# Patient Record
Sex: FEMALE | Race: WHITE | NOT HISPANIC OR LATINO | Employment: UNEMPLOYED | URBAN - METROPOLITAN AREA
[De-identification: names, ages, dates, MRNs, and addresses within clinical notes are randomized per-mention and may not be internally consistent; named-entity substitution may affect disease eponyms.]

---

## 2023-02-08 ENCOUNTER — APPOINTMENT (OUTPATIENT)
Dept: GENERAL RADIOLOGY | Facility: HOSPITAL | Age: 63
End: 2023-02-08
Payer: COMMERCIAL

## 2023-02-08 ENCOUNTER — HOSPITAL ENCOUNTER (EMERGENCY)
Facility: HOSPITAL | Age: 63
Discharge: HOME | End: 2023-02-08
Attending: EMERGENCY MEDICINE | Admitting: EMERGENCY MEDICINE
Payer: COMMERCIAL

## 2023-02-08 VITALS
RESPIRATION RATE: 14 BRPM | SYSTOLIC BLOOD PRESSURE: 140 MMHG | TEMPERATURE: 98.6 F | HEIGHT: 63 IN | DIASTOLIC BLOOD PRESSURE: 96 MMHG | HEART RATE: 73 BPM | BODY MASS INDEX: 29.95 KG/M2 | OXYGEN SATURATION: 92 % | WEIGHT: 169 LBS

## 2023-02-08 DIAGNOSIS — R00.2 PALPITATIONS: Primary | ICD-10-CM

## 2023-02-08 DIAGNOSIS — I49.49 PREMATURE BEATS: ICD-10-CM

## 2023-02-08 LAB
ALBUMIN SERPL-MCNC: 4.5 G/DL (ref 3.5–5.2)
ALBUMIN/GLOB SERPL: 1.6 G/DL
ALP SERPL-CCNC: 108 U/L (ref 39–117)
ALT SERPL W P-5'-P-CCNC: 52 U/L (ref 1–33)
ANION GAP SERPL CALCULATED.3IONS-SCNC: 9 MMOL/L (ref 5–15)
AST SERPL-CCNC: 44 U/L (ref 1–32)
BASOPHILS # BLD AUTO: 0.06 10*3/MM3 (ref 0–0.2)
BASOPHILS NFR BLD AUTO: 0.7 % (ref 0–1.5)
BILIRUB SERPL-MCNC: 1.4 MG/DL (ref 0–1.2)
BUN SERPL-MCNC: 13 MG/DL (ref 8–23)
BUN/CREAT SERPL: 18.3 (ref 7–25)
CALCIUM SPEC-SCNC: 9.4 MG/DL (ref 8.6–10.5)
CHLORIDE SERPL-SCNC: 103 MMOL/L (ref 98–107)
CO2 SERPL-SCNC: 29 MMOL/L (ref 22–29)
CREAT SERPL-MCNC: 0.71 MG/DL (ref 0.57–1)
DEPRECATED RDW RBC AUTO: 42.2 FL (ref 37–54)
EGFRCR SERPLBLD CKD-EPI 2021: 95.7 ML/MIN/1.73
EOSINOPHIL # BLD AUTO: 0.17 10*3/MM3 (ref 0–0.4)
EOSINOPHIL NFR BLD AUTO: 1.9 % (ref 0.3–6.2)
ERYTHROCYTE [DISTWIDTH] IN BLOOD BY AUTOMATED COUNT: 12.8 % (ref 12.3–15.4)
GEN 5 2HR TROPONIN T REFLEX: <6 NG/L
GLOBULIN UR ELPH-MCNC: 2.9 GM/DL
GLUCOSE SERPL-MCNC: 110 MG/DL (ref 65–99)
HCT VFR BLD AUTO: 49.5 % (ref 34–46.6)
HGB BLD-MCNC: 16.2 G/DL (ref 12–15.9)
HOLD SPECIMEN: NORMAL
IMM GRANULOCYTES # BLD AUTO: 0.02 10*3/MM3 (ref 0–0.05)
IMM GRANULOCYTES NFR BLD AUTO: 0.2 % (ref 0–0.5)
LYMPHOCYTES # BLD AUTO: 2.08 10*3/MM3 (ref 0.7–3.1)
LYMPHOCYTES NFR BLD AUTO: 23.6 % (ref 19.6–45.3)
MAGNESIUM SERPL-MCNC: 2 MG/DL (ref 1.6–2.4)
MCH RBC QN AUTO: 29.6 PG (ref 26.6–33)
MCHC RBC AUTO-ENTMCNC: 32.7 G/DL (ref 31.5–35.7)
MCV RBC AUTO: 90.3 FL (ref 79–97)
MONOCYTES # BLD AUTO: 0.78 10*3/MM3 (ref 0.1–0.9)
MONOCYTES NFR BLD AUTO: 8.8 % (ref 5–12)
NEUTROPHILS NFR BLD AUTO: 5.72 10*3/MM3 (ref 1.7–7)
NEUTROPHILS NFR BLD AUTO: 64.8 % (ref 42.7–76)
NRBC BLD AUTO-RTO: 0 /100 WBC (ref 0–0.2)
NT-PROBNP SERPL-MCNC: 50.6 PG/ML (ref 0–900)
PLATELET # BLD AUTO: 273 10*3/MM3 (ref 140–450)
PMV BLD AUTO: 11.1 FL (ref 6–12)
POTASSIUM SERPL-SCNC: 4.3 MMOL/L (ref 3.5–5.2)
PROT SERPL-MCNC: 7.4 G/DL (ref 6–8.5)
RBC # BLD AUTO: 5.48 10*6/MM3 (ref 3.77–5.28)
SODIUM SERPL-SCNC: 141 MMOL/L (ref 136–145)
TROPONIN T DELTA: NORMAL
TROPONIN T SERPL HS-MCNC: <6 NG/L
TSH SERPL DL<=0.05 MIU/L-ACNC: 1.31 UIU/ML (ref 0.27–4.2)
WBC NRBC COR # BLD: 8.83 10*3/MM3 (ref 3.4–10.8)
WHOLE BLOOD HOLD COAG: NORMAL
WHOLE BLOOD HOLD SPECIMEN: NORMAL

## 2023-02-08 PROCEDURE — 85025 COMPLETE CBC W/AUTO DIFF WBC: CPT

## 2023-02-08 PROCEDURE — 84484 ASSAY OF TROPONIN QUANT: CPT

## 2023-02-08 PROCEDURE — 83880 ASSAY OF NATRIURETIC PEPTIDE: CPT

## 2023-02-08 PROCEDURE — 83735 ASSAY OF MAGNESIUM: CPT

## 2023-02-08 PROCEDURE — 36415 COLL VENOUS BLD VENIPUNCTURE: CPT

## 2023-02-08 PROCEDURE — 71045 X-RAY EXAM CHEST 1 VIEW: CPT

## 2023-02-08 PROCEDURE — 93005 ELECTROCARDIOGRAM TRACING: CPT

## 2023-02-08 PROCEDURE — 80053 COMPREHEN METABOLIC PANEL: CPT

## 2023-02-08 PROCEDURE — 84443 ASSAY THYROID STIM HORMONE: CPT

## 2023-02-08 PROCEDURE — 99282 EMERGENCY DEPT VISIT SF MDM: CPT

## 2023-02-08 RX ORDER — SODIUM CHLORIDE 0.9 % (FLUSH) 0.9 %
10 SYRINGE (ML) INJECTION AS NEEDED
Status: DISCONTINUED | OUTPATIENT
Start: 2023-02-08 | End: 2023-02-08 | Stop reason: HOSPADM

## 2023-02-08 NOTE — ED PROVIDER NOTES
Subjective   History of Present Illness  62-year-old female who presents for evaluation of low heart rate and fatigue.  The patient reports that for the last 2 weeks she has been intermittently in a low heart rate.  She reports at times she has recorded to less than 40.  She does report feeling mildly fatigued in association with it.  She denies any history of an abnormal heart rhythm.  She does admit that she is checking that on a pulse oximeter or on her Fitbit watch.  She is not truly checking the electrical activity.  She does not take any anticoagulation.  No reported chest or abdominal pain.  No reported fever, body aches, or chills.  She feels like her oral intake of fluid is normal.  She appears well nontoxic and in no acute distress.  No new medications.  No other acute complaints        Review of Systems   Constitutional: Positive for fatigue. Negative for chills and fever.   HENT: Negative for congestion, ear pain, postnasal drip, sinus pressure and sore throat.    Eyes: Negative for pain, redness and visual disturbance.   Respiratory: Negative for cough, chest tightness and shortness of breath.    Cardiovascular: Positive for palpitations. Negative for chest pain and leg swelling.   Gastrointestinal: Negative for abdominal pain, anal bleeding, blood in stool, diarrhea, nausea and vomiting.   Endocrine: Negative for polydipsia and polyuria.   Genitourinary: Negative for difficulty urinating, dysuria, frequency and urgency.   Musculoskeletal: Negative for arthralgias, back pain and neck pain.   Skin: Negative for pallor and rash.   Allergic/Immunologic: Negative for environmental allergies and immunocompromised state.   Neurological: Negative for dizziness, weakness and headaches.   Hematological: Negative for adenopathy.   Psychiatric/Behavioral: Negative for confusion, self-injury and suicidal ideas. The patient is not nervous/anxious.    All other systems reviewed and are negative.      No past medical  history on file.    Not on File    No past surgical history on file.    No family history on file.    Social History     Socioeconomic History   • Marital status:            Objective   Physical Exam  Vitals and nursing note reviewed.   Constitutional:       General: She is not in acute distress.     Appearance: Normal appearance. She is well-developed. She is not toxic-appearing or diaphoretic.   HENT:      Head: Normocephalic and atraumatic.      Right Ear: External ear normal.      Left Ear: External ear normal.      Nose: Nose normal.   Eyes:      General: Lids are normal.      Pupils: Pupils are equal, round, and reactive to light.   Neck:      Trachea: No tracheal deviation.   Cardiovascular:      Rate and Rhythm: Normal rate. Rhythm irregularly irregular.      Pulses: No decreased pulses.      Heart sounds: Normal heart sounds. No murmur heard.    No friction rub. No gallop.      Comments: Patient was noted to have significant ectopy with PACs and PVCs identified on EKG.  Heart rate is normal in the 80s.  Pulmonary:      Effort: Pulmonary effort is normal. No respiratory distress.      Breath sounds: Normal breath sounds. No decreased breath sounds, wheezing, rhonchi or rales.   Abdominal:      General: Bowel sounds are normal.      Palpations: Abdomen is soft.      Tenderness: There is no abdominal tenderness. There is no guarding or rebound.   Musculoskeletal:         General: No deformity. Normal range of motion.      Cervical back: Normal range of motion and neck supple.   Lymphadenopathy:      Cervical: No cervical adenopathy.   Skin:     General: Skin is warm and dry.      Findings: No rash.   Neurological:      Mental Status: She is alert and oriented to person, place, and time.      Cranial Nerves: No cranial nerve deficit.      Sensory: No sensory deficit.   Psychiatric:         Speech: Speech normal.         Behavior: Behavior normal.         Thought Content: Thought content normal.          Judgment: Judgment normal.         Procedures           ED Course                                           Medical Decision Making  Differential diagnosis includes premature beats, abnormal heart rhythm, near syncope, dehydration, anemia, electrolyte abnormality.    The patient was identified to have both PACs and PVCs on the EKG that was performed here.  I feel this is most likely accounting for her low recorded heart rate.  I suspect that her heart rate is not truly low.  No abnormal heart rhythm identified.    Lab work is normal and reassuring.    Vital signs are normal and reassuring.  The patient has a normal neurological exam.    Palpitations: acute illness or injury     Details: Referred to cardiology clinic for outpatient evaluation.  Premature beats: acute illness or injury     Details: Multiple PACs and PVCs identified on rhythm strip.  I feel this is leading to a decrease recorded rate on peripheral devices such as a Fitbit and pulse oximeter  Amount and/or Complexity of Data Reviewed  Independent Historian: spouse  Labs: ordered.     Details: Normal nonconcerning lab work-up.  Radiology: ordered.     Details: Chest x-ray reports possible pulmonary vascular congestion.  BNP is normal.  Lungs are clear to auscultation.  Oxygen saturations normal.  ECG/medicine tests: ordered.     Details: EKG does not show acute ischemic changes.  PACs and PVCs.          Final diagnoses:   Palpitations   Premature beats       ED Disposition  ED Disposition     ED Disposition   Discharge    Condition   Stable    Comment   --             Kelsie Jacobsen MD  [address] [facility] [address]  [phone]    In 1 week      [address]  [facility] [address] [phone]             Medication List      No changes were made to your prescriptions during this visit.          Baldemar Price MD  02/08/23 9716

## 2023-02-15 PROBLEM — K21.9 GASTROESOPHAGEAL REFLUX DISEASE WITHOUT ESOPHAGITIS: Status: ACTIVE | Noted: 2023-02-15

## 2023-02-15 PROBLEM — K76.0 NONALCOHOLIC FATTY LIVER DISEASE: Status: ACTIVE | Noted: 2023-02-15

## 2023-02-15 PROBLEM — I10 HYPERTENSION: Status: ACTIVE | Noted: 2023-02-15

## 2023-02-15 NOTE — PROGRESS NOTES
"[facility]  Heart and Valve Center    Chief Complaint  Palpitations and Establish Care    Subjective    History of Present Illness {CC  Problem List  Visit  Diagnosis   Encounters  Notes  Medications  Labs  Result Review Imaging  Media :23}     Tessa De La Rosa is a 62 y.o. female with hypertension, fatty liver, GERD who presents today for evaluation of palpitations at the request of Dr. Price.    Patient presented to the ED on 2/8 for evaluation of bradycardia and fatigue.  She reports for the last few weeks she had an intermittent bradycardia and has recorded heart rates less than 40.  She notes some mild fatigue.  She usually is checking her pulse on her Fitbit watch or pulse oximeter.  EKG in the ER did show occasional PVCs and PACs.    She reports symptoms started 3 weeks ago. Feels like a flutter, occasional lightheadedness. Worse when laying down.  She stays busy picking up grandchildren from school and visiting her mother in the nursing home. Reports insomnia,  says she does not sleep    Denies syncope. Rare near syncope, one episode of orthostatic lightheadedness while on vacation. Admits to not drinking much fluids. 1 serving caffeine    Hospitalized with COVID in 2020 with PNA, has been less active since    No prior cardiac evaluation     Cardiac risks: HTN, family hx (father had MI at age 53), age    Objective     Vital Signs:   Vitals:    02/16/23 0944 02/16/23 0946 02/16/23 0948   BP: 141/88 128/72 142/82   BP Location: Right arm Left arm Left arm   Patient Position: Sitting Standing Sitting   Cuff Size: Adult Adult Adult   Pulse: 78 63 78   Resp:   18   Temp:   98.7 °F (37.1 °C)   TempSrc:   Temporal   SpO2: 94% 96% 95%   Weight:   77.1 kg (170 lb)   Height:   160 cm (63\")     Body mass index is 30.11 kg/m².  Physical Exam  Vitals reviewed.   Constitutional:       Appearance: Normal appearance.   HENT:      Head: Normocephalic.   Neck:      Vascular: No carotid bruit. "   Cardiovascular:      Rate and Rhythm: Normal rate and regular rhythm. Occasional extrasystoles are present.     Pulses: Normal pulses.      Heart sounds: Normal heart sounds, S1 normal and S2 normal. No murmur heard.  Pulmonary:      Effort: Pulmonary effort is normal. No respiratory distress.      Breath sounds: Normal breath sounds.   Chest:      Chest wall: No tenderness.   Abdominal:      General: Abdomen is flat.      Palpations: Abdomen is soft.   Musculoskeletal:      Cervical back: Neck supple.      Right lower leg: No edema.      Left lower leg: No edema.   Skin:     General: Skin is warm and dry.   Neurological:      General: No focal deficit present.      Mental Status: She is alert and oriented to person, place, and time. Mental status is at baseline.   Psychiatric:         Mood and Affect: Mood normal.         Behavior: Behavior normal.         Thought Content: Thought content normal.              Result Review  Data Reviewed:{ Labs  Result Review  Imaging  Med Tab  Media :23}   High Sensitivity Troponin T 2Hr (02/08/2023 14:49)  BNP (02/08/2023 12:50)  TSH (02/08/2023 12:50)  Magnesium (02/08/2023 12:50)  Troponin (02/08/2023 12:50)  Comprehensive Metabolic Panel (02/08/2023 12:50)  CBC & Differential (02/08/2023 12:50)  ECG 12 Lead ED Triage Standing Order; Dysrhythmia (02/08/2023 12:29)             Assessment and Plan {CC Problem List  Visit Diagnosis  ROS  Review (Popup)  Health Maintenance  Quality  BestPractice  Medications  SmartSets  SnapShot Encounters  Media :23}   1. PVC's (premature ventricular contractions)  -We will first do Holter monitor to evaluate burden and check echo for structural abnormalities.  Depending on burden, may consider ischemic evaluation  - Adult Transthoracic Echo Complete W/ Cont if Necessary Per Protocol; Future  - Holter Monitor - 72 Hour Up To 15 Days; Future    2. Premature atrial contraction    - Adult Transthoracic Echo Complete W/ Cont if  Necessary Per Protocol; Future  - Holter Monitor - 72 Hour Up To 15 Days; Future    3. Lightheadedness  -She has some slight orthostatic hypotension today.  Encouraged her to increase fluids  - Adult Transthoracic Echo Complete W/ Cont if Necessary Per Protocol; Future  - Holter Monitor - 72 Hour Up To 15 Days; Future    4. Primary hypertension  - Reasonably controlled  -Continue benazepril and amlodipine    5. Family history of premature CAD  -Currently no symptoms of angina.  Encouraged her to increase her level of physical activity and call if she has any exertional symptoms.  We will consider ischemic evaluation depending on ectopy burden        Follow Up {Instructions Charge Capture  Follow-up Communications :23}   Return in about 6 weeks (around 3/30/2023) for Video Visit, Monitor results.    Patient was given instructions and counseling regarding her condition or for health maintenance advice. Please see specific information pulled into the AVS if appropriate.  Advised to call the Heart and Valve Center with any questions, concerns, or worsening symptoms.

## 2023-02-16 ENCOUNTER — OFFICE VISIT (OUTPATIENT)
Dept: CARDIOLOGY | Facility: HOSPITAL | Age: 63
End: 2023-02-16
Payer: COMMERCIAL

## 2023-02-16 ENCOUNTER — HOSPITAL ENCOUNTER (OUTPATIENT)
Dept: CARDIOLOGY | Facility: HOSPITAL | Age: 63
Discharge: HOME | End: 2023-02-16
Payer: COMMERCIAL

## 2023-02-16 VITALS
SYSTOLIC BLOOD PRESSURE: 142 MMHG | BODY MASS INDEX: 30.12 KG/M2 | TEMPERATURE: 98.7 F | DIASTOLIC BLOOD PRESSURE: 82 MMHG | RESPIRATION RATE: 18 BRPM | HEART RATE: 78 BPM | HEIGHT: 63 IN | WEIGHT: 170 LBS | OXYGEN SATURATION: 95 %

## 2023-02-16 DIAGNOSIS — I49.1 PREMATURE ATRIAL CONTRACTION: ICD-10-CM

## 2023-02-16 DIAGNOSIS — I49.3 PVC'S (PREMATURE VENTRICULAR CONTRACTIONS): Primary | ICD-10-CM

## 2023-02-16 DIAGNOSIS — I49.3 PVC'S (PREMATURE VENTRICULAR CONTRACTIONS): ICD-10-CM

## 2023-02-16 DIAGNOSIS — R42 LIGHTHEADEDNESS: ICD-10-CM

## 2023-02-16 DIAGNOSIS — I10 PRIMARY HYPERTENSION: ICD-10-CM

## 2023-02-16 DIAGNOSIS — Z82.49 FAMILY HISTORY OF PREMATURE CAD: ICD-10-CM

## 2023-02-16 PROCEDURE — 99204 OFFICE O/P NEW MOD 45 MIN: CPT | Performed by: NURSE PRACTITIONER

## 2023-02-16 PROCEDURE — 93246 EXT ECG>7D<15D RECORDING: CPT

## 2023-02-16 RX ORDER — ASPIRIN 81 MG/1
81 TABLET ORAL DAILY
COMMUNITY

## 2023-02-16 RX ORDER — BENAZEPRIL HYDROCHLORIDE 20 MG/1
20 TABLET ORAL DAILY
COMMUNITY
Start: 2023-01-11

## 2023-02-16 RX ORDER — OMEPRAZOLE 20 MG/1
20 CAPSULE, DELAYED RELEASE ORAL DAILY
COMMUNITY
Start: 2023-01-10

## 2023-02-16 RX ORDER — LEVOTHYROXINE SODIUM 88 UG/1
88 TABLET ORAL DAILY
COMMUNITY
Start: 2023-01-10

## 2023-02-16 RX ORDER — AMLODIPINE BESYLATE 5 MG/1
5 TABLET ORAL DAILY
COMMUNITY
Start: 2023-01-10

## 2023-02-16 NOTE — PROGRESS NOTES
[facility] Heart Monitor Documentation    Tessa De La Rosa  1960  [id number]  02/16/23      [] ZIO XT Patch  Model [id number] Prescribed for N/A Days    · Serial Number: (N + 9 Digits) N   · Apply-By Date on Box:   · USPS Tracking Number:   · USPS Tracking        [] [employer] Body[employer]ian MINI PLUS Mobile Cardiac Telemetry  Model BGMINIPLUS Prescribed for N/A Days    · Serial Number: (BGM + 7 Digits) BGM  · Shipped-By Date on Box:   · UPS Tracking Number: [id number]  · UPS Tracking      [] [employer]ian MINI Holter Monitor  Model BGMINIEL Prescribed for 14 Days    · Serial Number: (7 Digits) [id number]  · Shipped-By Date on Box: 727526  · UPS Tracking Number: [id number]  · UPS Tracking        This monitor was applied to the patient's chest and checked for proper functioning.  Ms. Tessa De La Rosa was instructed in the proper use of this monitor.  She was given the opportunity to ask questions and left the office with the device 's instruction manual.    Jes Ngo MA, 10:51 EST, 02/16/23                  [facility] 8.[facility]

## 2023-02-28 LAB
QT INTERVAL: 370 MS
QTC INTERVAL: 429 MS

## 2023-03-14 ENCOUNTER — TELEPHONE (OUTPATIENT)
Dept: CARDIOLOGY | Facility: HOSPITAL | Age: 63
End: 2023-03-14
Payer: COMMERCIAL

## 2023-03-16 ENCOUNTER — TELEPHONE (OUTPATIENT)
Dept: CARDIOLOGY | Facility: HOSPITAL | Age: 63
End: 2023-03-16
Payer: COMMERCIAL

## 2023-03-17 PROCEDURE — 93248 EXT ECG>7D<15D REV&INTERPJ: CPT | Performed by: INTERNAL MEDICINE

## 2023-03-28 ENCOUNTER — HOSPITAL ENCOUNTER (OUTPATIENT)
Dept: CARDIOLOGY | Facility: HOSPITAL | Age: 63
Discharge: HOME | End: 2023-03-28
Admitting: NURSE PRACTITIONER
Payer: COMMERCIAL

## 2023-03-28 VITALS — HEIGHT: 63 IN | BODY MASS INDEX: 30.12 KG/M2 | WEIGHT: 169.97 LBS

## 2023-03-28 DIAGNOSIS — I49.3 PVC'S (PREMATURE VENTRICULAR CONTRACTIONS): ICD-10-CM

## 2023-03-28 DIAGNOSIS — R42 LIGHTHEADEDNESS: ICD-10-CM

## 2023-03-28 DIAGNOSIS — I49.1 PREMATURE ATRIAL CONTRACTION: ICD-10-CM

## 2023-03-28 PROCEDURE — 93306 TTE W/DOPPLER COMPLETE: CPT | Performed by: INTERNAL MEDICINE

## 2023-03-28 PROCEDURE — 93306 TTE W/DOPPLER COMPLETE: CPT

## 2023-03-31 LAB
AORTIC DIMENSIONLESS INDEX: 0.9 (DI)
BH CV ECHO MEAS - ACS: 1.56 CM
BH CV ECHO MEAS - AO MAX PG: 6.9 MMHG
BH CV ECHO MEAS - AO MEAN PG: 4.1 MMHG
BH CV ECHO MEAS - AO ROOT DIAM: 2.9 CM
BH CV ECHO MEAS - AO V2 MAX: 131.4 CM/SEC
BH CV ECHO MEAS - AO V2 VTI: 30.5 CM
BH CV ECHO MEAS - AVA(I,D): 3.1 CM2
BH CV ECHO MEAS - EDV(CUBED): 45.6 ML
BH CV ECHO MEAS - EDV(MOD-SP4): 64.5 ML
BH CV ECHO MEAS - EF(MOD-SP4): 57.2 %
BH CV ECHO MEAS - EF_3D-VOL: 62 %
BH CV ECHO MEAS - ESV(CUBED): 7.8 ML
BH CV ECHO MEAS - ESV(MOD-SP4): 27.6 ML
BH CV ECHO MEAS - FS: 44.5 %
BH CV ECHO MEAS - IVS/LVPW: 0.88 CM
BH CV ECHO MEAS - IVSD: 1.07 CM
BH CV ECHO MEAS - LA A2CS (ATRIAL LENGTH): 4.9 CM
BH CV ECHO MEAS - LA A4C LENGTH: 5.1 CM
BH CV ECHO MEAS - LA DIMENSION: 2.8 CM
BH CV ECHO MEAS - LAT PEAK E' VEL: 10.9 CM/SEC
BH CV ECHO MEAS - LV DIASTOLIC VOL/BSA (35-75): 35.7 CM2
BH CV ECHO MEAS - LV MASS(C)D: 129.5 GRAMS
BH CV ECHO MEAS - LV MAX PG: 5 MMHG
BH CV ECHO MEAS - LV MEAN PG: 2.47 MMHG
BH CV ECHO MEAS - LV SYSTOLIC VOL/BSA (12-30): 15.3 CM2
BH CV ECHO MEAS - LV V1 MAX: 111.6 CM/SEC
BH CV ECHO MEAS - LV V1 VTI: 26.9 CM
BH CV ECHO MEAS - LVIDD: 3.6 CM
BH CV ECHO MEAS - LVIDS: 1.98 CM
BH CV ECHO MEAS - LVOT AREA: 3.5 CM2
BH CV ECHO MEAS - LVOT DIAM: 2.12 CM
BH CV ECHO MEAS - LVPWD: 1.21 CM
BH CV ECHO MEAS - MED PEAK E' VEL: 7.8 CM/SEC
BH CV ECHO MEAS - MV A MAX VEL: 93.3 CM/SEC
BH CV ECHO MEAS - MV DEC SLOPE: 353.3 CM/SEC2
BH CV ECHO MEAS - MV DEC TIME: 0.3 MSEC
BH CV ECHO MEAS - MV E MAX VEL: 74.7 CM/SEC
BH CV ECHO MEAS - MV E/A: 0.8
BH CV ECHO MEAS - MV MAX PG: 3.6 MMHG
BH CV ECHO MEAS - MV MEAN PG: 1.42 MMHG
BH CV ECHO MEAS - MV P1/2T: 68.1 MSEC
BH CV ECHO MEAS - MV V2 VTI: 24.9 CM
BH CV ECHO MEAS - MVA(P1/2T): 3.2 CM2
BH CV ECHO MEAS - MVA(VTI): 3.8 CM2
BH CV ECHO MEAS - RVDD: 2.5 CM
BH CV ECHO MEAS - SI(MOD-SP4): 20.4 ML/M2
BH CV ECHO MEAS - SV(LVOT): 95 ML
BH CV ECHO MEAS - SV(MOD-SP4): 36.9 ML
BH CV ECHO MEASUREMENTS AVERAGE E/E' RATIO: 7.99
LEFT ATRIUM VOLUME INDEX: 14.7 ML/M2
LEFT ATRIUM VOLUME: 26 ML
MAXIMAL PREDICTED HEART RATE: 157 BPM
STRESS TARGET HR: 133 BPM

## 2023-04-13 NOTE — PROGRESS NOTES
[facility]  Heart and Valve Center      Mode of Visit: Video  Location of patient: home  You have chosen to receive care through a telehealth visit.  Does the patient consent to use a video/audio connection for your medical care today? Yes  The visit included audio and video interaction. No technical issues occurred during this visit.     Chief Complaint  Follow-up and Palpitations    History of Present Illness    Tessa De La Rosa is a 62 y.o. female with hypertension, fatty liver, GERD who presents today as a telemedicine follow up for palpitations.     Patient presented to the ED on 2/8 for evaluation of bradycardia and fatigue.  She reports for the last few weeks she had an intermittent bradycardia and has recorded heart rates less than 40. She notes some mild fatigue.  She usually is checking her pulse on her Fitbit watch or pulse oximeter.  EKG in the ER did show occasional PVCs and PACs.    She stays busy picking up grandchildren from school and visiting her mother in the nursing home. Reports insomnia,  says she does not sleep. Extended holter monitor showed frequent PACs (12.5%) and occasional PVCs (2.6%). Multiple runs of brief SVT/AT, longest 11 beats.  Echo showed normal LVEF, borderline LVH, grade 1 diastolic dysfunction.    She reports that palpitations seem better and has not been paying much attention to them. No dizziness, shortness of breath. She does report exertional dyspnea if she climbs stairs. Has not been exercising much. She does feel some mild palpitations daily     Cardiac risks: HTN, family hx (father had MI at age 53), age      Objective     Vital Signs:   There were no vitals filed for this visit.  There is no height or weight on file to calculate BMI.    Virtual Visit Physical Exam  Physical Exam  Constitutional:       Appearance: Normal appearance.   HENT:      Head: Normocephalic.   Pulmonary:      Effort: Pulmonary effort is normal. No respiratory distress.    Neurological:      Mental Status: She is alert and oriented to person, place, and time.   Psychiatric:         Mood and Affect: Mood normal.         Behavior: Behavior normal.         Thought Content: Thought content normal.              Result Review  Data Reviewed:{ Labs  Result Review  Imaging  Med Tab  Media :23}   Adult Transthoracic Echo Complete W/ Cont if Necessary Per Protocol (03/28/2023 16:15)  Holter Monitor - 72 Hour Up To 15 Days (02/16/2023 14:15)             Assessment and Plan {CC Problem List  Visit Diagnosis  ROS  Review (Popup)  Health Maintenance  Quality  BestPractice  Medications  SmartSets  SnapShot Encounters  Media :23}   1. GAY (dyspnea on exertion)  - Due to multiple ASCVD risks will do stress echo to rule out ischemia  - Adult Stress Echo W/ Cont or Stress Agent if Necessary Per Protocol; Future    2. Family history of premature CAD    - Adult Stress Echo W/ Cont or Stress Agent if Necessary Per Protocol; Future    3. PVC's (premature ventricular contractions)  - 2.6%  - Adult Stress Echo W/ Cont or Stress Agent if Necessary Per Protocol; Future    4. Premature atrial contraction  - Due to frequent PACs and multiple brief runs of SVT/PAT will start bisoprolol 5mg daily. Advised to call if she has any side effects or HRs persistently 50 or below  - Adult Stress Echo W/ Cont or Stress Agent if Necessary Per Protocol; Future    5. Primary hypertension  -Recommended that she purchase a blood pressure cuff and start monitoring her blood pressure.  Blood pressure at her last office visit was slightly elevated.  Discussed goal blood pressure of 130/80 or less  - Adult Stress Echo W/ Cont or Stress Agent if Necessary Per Protocol; Future          Follow Up {Instructions Charge Capture  Follow-up Communications :23}   Return in about 4 weeks (around 5/12/2023) for Office follow up.    Patient was given instructions and counseling regarding her condition or for health  maintenance advice. Please see specific information pulled into the AVS if appropriate.  Advised to call the Heart and Valve Center with any questions, concerns, or worsening symptoms.    Dictated Utilizing Dragon Dictation

## 2023-04-14 ENCOUNTER — TELEMEDICINE (OUTPATIENT)
Dept: CARDIOLOGY | Facility: HOSPITAL | Age: 63
End: 2023-04-14
Payer: COMMERCIAL

## 2023-04-14 DIAGNOSIS — R06.09 DOE (DYSPNEA ON EXERTION): Primary | ICD-10-CM

## 2023-04-14 DIAGNOSIS — I49.3 PVC'S (PREMATURE VENTRICULAR CONTRACTIONS): ICD-10-CM

## 2023-04-14 DIAGNOSIS — I10 PRIMARY HYPERTENSION: ICD-10-CM

## 2023-04-14 DIAGNOSIS — Z82.49 FAMILY HISTORY OF PREMATURE CAD: ICD-10-CM

## 2023-04-14 DIAGNOSIS — I49.1 PREMATURE ATRIAL CONTRACTION: ICD-10-CM

## 2023-04-14 RX ORDER — BISOPROLOL FUMARATE 5 MG/1
5 TABLET, FILM COATED ORAL DAILY
Qty: 30 TABLET | Refills: 3 | Status: SHIPPED | OUTPATIENT
Start: 2023-04-14

## 2023-04-21 ENCOUNTER — TELEPHONE (OUTPATIENT)
Dept: CARDIOLOGY | Facility: HOSPITAL | Age: 63
End: 2023-04-21
Payer: COMMERCIAL

## 2023-04-21 DIAGNOSIS — Z82.49 FAMILY HISTORY OF PREMATURE CAD: ICD-10-CM

## 2023-04-21 DIAGNOSIS — R06.09 DOE (DYSPNEA ON EXERTION): Primary | ICD-10-CM

## 2023-04-21 DIAGNOSIS — I49.3 PVC'S (PREMATURE VENTRICULAR CONTRACTIONS): ICD-10-CM

## 2023-04-26 ENCOUNTER — DOCUMENTATION (OUTPATIENT)
Dept: CARDIOLOGY | Facility: HOSPITAL | Age: 63
End: 2023-04-26
Payer: COMMERCIAL

## 2023-04-26 NOTE — PROGRESS NOTES
Ayah Fernando, Louise Mccormack APRN  Spoke with patient about setting up this test since the insurance company has finally approved it. She states she does not wish to schedule at this time. She wants to wait and have her follow up appointment and discuss it again at that time.

## 2023-05-17 NOTE — PROGRESS NOTES
"[facility]  Heart and Valve Center    Chief Complaint  Hypertension and Follow-up    History of Present Illness    Tessa De La Rosa is a 62 y.o. female with hypertension, fatty liver, GERD who presents today as a telemedicine follow up for palpitations.     Patient presented to the ED on 2/8 for evaluation of bradycardia and fatigue.  She reports for the last few weeks she had an intermittent bradycardia and has recorded heart rates less than 40. She notes some mild fatigue.  She usually is checking her pulse on her Fitbit watch or pulse oximeter.  EKG in the ER did show occasional PVCs and PACs.    Extended holter monitor showed frequent PACs (12.5%) and occasional PVCs (2.6%). Multiple runs of brief SVT/AT, longest 11 beats.  Echo showed normal LVEF, borderline LVH, grade 1 diastolic dysfunction.    She was started on bisoprolol at last visit. A stress test was ordered but she deferred due to lack of a female technician. She had initial fatigue on the bisoprolol but recently has improved.No dizziness or lightheadedness. Palpitations have improved.  She denies chest pain or shortness of breath     Cardiac risks: HTN, family hx (father had MI at age 53), age       Objective     Vital Signs:   Vitals:    05/19/23 0959   BP: 139/72   BP Location: Left arm   Patient Position: Sitting   Cuff Size: Adult   Pulse: 65   Resp: 17   Temp: 97.1 °F (36.2 °C)   TempSrc: Temporal   SpO2: 96%   Weight: 76.3 kg (168 lb 4 oz)   Height: 160 cm (62.99\")     Body mass index is 29.81 kg/m².      Physical Exam  Vitals reviewed.   Constitutional:       Appearance: Normal appearance.   HENT:      Head: Normocephalic.   Neck:      Vascular: No carotid bruit.   Cardiovascular:      Rate and Rhythm: Normal rate and regular rhythm.      Pulses: Normal pulses.      Heart sounds: Normal heart sounds, S1 normal and S2 normal. No murmur heard.  Pulmonary:      Effort: Pulmonary effort is normal. No respiratory distress.      " Breath sounds: Normal breath sounds.   Chest:      Chest wall: No tenderness.   Abdominal:      General: Abdomen is flat.      Palpations: Abdomen is soft.   Musculoskeletal:      Cervical back: Neck supple.      Right lower leg: No edema.      Left lower leg: No edema.   Skin:     General: Skin is warm and dry.   Neurological:      General: No focal deficit present.      Mental Status: She is alert and oriented to person, place, and time. Mental status is at baseline.   Psychiatric:         Mood and Affect: Mood normal.         Behavior: Behavior normal.         Thought Content: Thought content normal.              Result Review  Data Reviewed:{ Labs  Result Review  Imaging  Med Tab  Media :23}   Adult Transthoracic Echo Complete W/ Cont if Necessary Per Protocol (03/28/2023 16:15)  Holter Monitor - 72 Hour Up To 15 Days (02/16/2023 14:15)             Assessment and Plan {CC Problem List  Visit Diagnosis  ROS  Review (Popup)  Health Maintenance  Quality  BestPractice  Medications  SmartSets  SnapShot Encounters  Media :23}   1. Palpitations  -Recent Holter monitor showed 2.6% PVC burden and 12.5% PAC burden.  Symptoms have improved significantly with bisoprolol    2. Family history of premature CAD  -She deferred stress test and currently is asymptomatic.  We will consider coronary calcium scan for further cardiac restratification  -Reviewed LDL from January and it was 101    3. Primary hypertension  -Reviewed home blood pressure log and most systolic blood pressures are running in the 120s or less.  I did advise her to call me if systolic blood pressures are consistently less than 110 and will stop her amlodipine  -Encouraged regular exercise, healthy diet          Follow Up {Instructions Charge Capture  Follow-up Communications :23}   No follow-ups on file.    Patient was given instructions and counseling regarding her condition or for health maintenance advice. Please see specific information  pulled into the AVS if appropriate.  Advised to call the Heart and Valve Center with any questions, concerns, or worsening symptoms.    Dictated Utilizing Dragon Dictation

## 2023-05-19 ENCOUNTER — OFFICE VISIT (OUTPATIENT)
Dept: CARDIOLOGY | Facility: HOSPITAL | Age: 63
End: 2023-05-19
Payer: COMMERCIAL

## 2023-05-19 VITALS
OXYGEN SATURATION: 96 % | SYSTOLIC BLOOD PRESSURE: 139 MMHG | HEART RATE: 65 BPM | DIASTOLIC BLOOD PRESSURE: 72 MMHG | RESPIRATION RATE: 17 BRPM | TEMPERATURE: 97.1 F | BODY MASS INDEX: 29.81 KG/M2 | WEIGHT: 168.25 LBS | HEIGHT: 63 IN

## 2023-08-09 RX ORDER — BISOPROLOL FUMARATE 5 MG/1
TABLET, FILM COATED ORAL
Qty: 30 TABLET | Refills: 3 | Status: SHIPPED | OUTPATIENT
Start: 2023-08-09

## 2023-11-16 NOTE — PROGRESS NOTES
"[facility]  Heart and Valve Center    Chief Complaint  Palpitations    History of Present Illness    Tessa De La Rosa is a 63 y.o. female with hypertension, fatty liver, GERD who presents today to follow up on hypertension and palpitations    She was placed in holter monitor in March showed frequent PACs (12.5%) and occasional PVCs (2.6%). Multiple runs of brief SVT/AT, longest 11 beats.  Echo showed normal LVEF, borderline LVH, grade 1 diastolic dysfunction. Reports rare palpitations. She reports when she checks her BPs at home most are 120 or less. Still struggles with insomnia and doesn't sleep much       Cardiac risks: HTN, family hx (father had MI at age 53), age       Objective     Vital Signs:   Vitals:    11/20/23 1014   BP: 135/76   BP Location: Left arm   Patient Position: Sitting   Cuff Size: Adult   Pulse: 69   Resp: 16   Temp: 97.3 °F (36.3 °C)   TempSrc: Temporal   SpO2: 92%   Weight: 78.6 kg (173 lb 3.2 oz)   Height: 160 cm (63\")       Body mass index is 30.68 kg/m².      Physical Exam  Vitals reviewed.   Constitutional:       Appearance: Normal appearance.   HENT:      Head: Normocephalic.   Neck:      Vascular: No carotid bruit.   Cardiovascular:      Rate and Rhythm: Normal rate and regular rhythm.      Pulses: Normal pulses.      Heart sounds: Normal heart sounds, S1 normal and S2 normal. No murmur heard.  Pulmonary:      Effort: Pulmonary effort is normal. No respiratory distress.      Breath sounds: Normal breath sounds.   Chest:      Chest wall: No tenderness.   Abdominal:      General: Abdomen is flat.      Palpations: Abdomen is soft.   Musculoskeletal:      Cervical back: Neck supple.      Right lower leg: No edema.      Left lower leg: No edema.   Skin:     General: Skin is warm and dry.   Neurological:      General: No focal deficit present.      Mental Status: She is alert and oriented to person, place, and time. Mental status is at baseline.   Psychiatric:         Mood " and Affect: Mood normal.         Behavior: Behavior normal.         Thought Content: Thought content normal.              Result Review  Data Reviewed:{ Labs  Result Review  Imaging  Med Tab  Media :23}   Adult Transthoracic Echo Complete W/ Cont if Necessary Per Protocol (03/28/2023 16:15)  Holter Monitor - 72 Hour Up To 15 Days (02/16/2023 14:15)             Assessment and Plan {CC Problem List  Visit Diagnosis  ROS  Review (Popup)  Health Maintenance  Quality  BestPractice  Medications  SmartSets  SnapShot Encounters  Media :23}   1. Palpitations  -Recent Holter monitor showed 2.6% PVC burden and 12.5% PAC burden.  -Symptoms have improved significantly with bisoprolol  - Encouraged her to continue to work on improving sleep, would follow up with PCP if no improvement with OTC meds    2. Family history of premature CAD  -She has deferred stress test and currently is asymptomatic.    - Plans to do CCS in the near future  -Reviewed LDL from January and it was 101    3. Primary hypertension  -Well controlled on amlodipine, bisoprolol and benazepril  - Encouraged regular exercise and healthy diet          Follow Up {Instructions Charge Capture  Follow-up Communications :23}   No follow-ups on file.    Patient was given instructions and counseling regarding her condition or for health maintenance advice. Please see specific information pulled into the AVS if appropriate.  Advised to call the Heart and Valve Center with any questions, concerns, or worsening symptoms.    Dictated Utilizing Dragon Dictation

## 2023-11-20 ENCOUNTER — OFFICE VISIT (OUTPATIENT)
Dept: CARDIOLOGY | Facility: HOSPITAL | Age: 63
End: 2023-11-20
Payer: COMMERCIAL

## 2023-11-20 VITALS
HEART RATE: 69 BPM | DIASTOLIC BLOOD PRESSURE: 76 MMHG | TEMPERATURE: 97.3 F | WEIGHT: 173.2 LBS | OXYGEN SATURATION: 92 % | SYSTOLIC BLOOD PRESSURE: 135 MMHG | HEIGHT: 63 IN | RESPIRATION RATE: 16 BRPM | BODY MASS INDEX: 30.69 KG/M2

## 2023-11-20 DIAGNOSIS — R00.2 PALPITATIONS: Primary | ICD-10-CM

## 2023-11-20 DIAGNOSIS — Z82.49 FAMILY HISTORY OF PREMATURE CAD: ICD-10-CM

## 2023-11-20 DIAGNOSIS — I10 PRIMARY HYPERTENSION: ICD-10-CM

## 2023-12-05 RX ORDER — BISOPROLOL FUMARATE 5 MG/1
5 TABLET, FILM COATED ORAL DAILY
Qty: 90 TABLET | Refills: 3 | Status: SHIPPED | OUTPATIENT
Start: 2023-12-05

## 2023-12-05 NOTE — TELEPHONE ENCOUNTER
Last seen on 11/20/23 and will follow up on 11/25/24. Sent refills for bisoprolol 5mg daily to [facility].     Kelsey Strickland, JevonD

## 2024-11-20 NOTE — PROGRESS NOTES
"[facility]  Heart and Valve Center    Chief Complaint  Palpitations    History of Present Illness    Tessa De La Rosa is a 64 y.o. female with hypertension, fatty liver, GERD, PACs and PVCs who presents today to follow up on hypertension and palpitations    She was placed in holter monitor in March 2023 showed frequent PACs (12.5%) and occasional PVCs (2.6%). Multiple runs of brief SVT/AT, longest 11 beats.  Echo showed normal LVEF, borderline LVH, grade 1 diastolic dysfunction.     She reports that she is doing well. Most SBPs 120s or less.  She reports rare palpitations.  Still struggles with insomnia       Cardiac risks: HTN, family hx (father had MI at age 53), age       Objective     Vital Signs:   Vitals:    11/25/24 1025   BP: 135/72   BP Location: Left arm   Patient Position: Sitting   Pulse: 72   SpO2: 92%   Weight: 79.4 kg (175 lb)   Height: 162.6 cm (64\")         Body mass index is 30.04 kg/m².      Physical Exam  Vitals reviewed.   Constitutional:       Appearance: Normal appearance.   HENT:      Head: Normocephalic.   Neck:      Vascular: No carotid bruit.   Cardiovascular:      Rate and Rhythm: Normal rate and regular rhythm. Occasional Extrasystoles are present.     Pulses: Normal pulses.      Heart sounds: Normal heart sounds, S1 normal and S2 normal. No murmur heard.  Pulmonary:      Effort: Pulmonary effort is normal. No respiratory distress.      Breath sounds: Normal breath sounds.   Chest:      Chest wall: No tenderness.   Abdominal:      General: Abdomen is flat.      Palpations: Abdomen is soft.   Musculoskeletal:      Cervical back: Neck supple.      Right lower leg: No edema.      Left lower leg: No edema.   Skin:     General: Skin is warm and dry.   Neurological:      General: No focal deficit present.      Mental Status: She is alert and oriented to person, place, and time. Mental status is at baseline.   Psychiatric:         Mood and Affect: Mood normal.         Behavior: " Behavior normal.         Thought Content: Thought content normal.              Result Review  Data Reviewed:{ Labs  Result Review  Imaging  Med Tab  Media :23}   Adult Transthoracic Echo Complete W/ Cont if Necessary Per Protocol (03/28/2023 16:15)  Holter Monitor - 72 Hour Up To 15 Days (02/16/2023 14:15)             Assessment and Plan {CC Problem List  Visit Diagnosis  ROS  Review (Popup)  Health Maintenance  Quality  BestPractice  Medications  SmartSets  SnapShot Encounters  Media :23}   1. Palpitations  -Holter monitor 2023 showed 2.6% PVC burden and 12.5% PAC burden.  She had occasional ectopy on exam.  Symptoms have improved significantly bisoprolol.  -Due to frequency of ectopy and presence on exam will repeat holter to see if it has improved on BB    2. Family history of premature CAD  -She has deferred stress test and currently is asymptomatic.    -Coronary calcium score ordered  -Will obtain last lipid levels    3. Primary hypertension  -Well controlled on amlodipine, bisoprolol and benazepril  - Encouraged regular exercise and healthy diet    Patient plans to follow-up with [place] [facility], referral placed      Follow Up {Instructions Charge Capture  Follow-up Communications :23}   Return if symptoms worsen or fail to improve.    Patient was given instructions and counseling regarding her condition or for health maintenance advice. Please see specific information pulled into the AVS if appropriate.  Advised to call the Heart and Valve Center with any questions, concerns, or worsening symptoms.    Dictated Utilizing Dragon Dictation

## 2024-11-25 ENCOUNTER — OFFICE VISIT (OUTPATIENT)
Dept: CARDIOLOGY | Facility: HOSPITAL | Age: 64
End: 2024-11-25
Payer: MEDICARE

## 2024-11-25 ENCOUNTER — HOSPITAL ENCOUNTER (OUTPATIENT)
Dept: CARDIOLOGY | Facility: HOSPITAL | Age: 64
Discharge: HOME | End: 2024-11-25
Payer: MEDICARE

## 2024-11-25 VITALS
DIASTOLIC BLOOD PRESSURE: 72 MMHG | BODY MASS INDEX: 29.88 KG/M2 | HEIGHT: 64 IN | HEART RATE: 72 BPM | OXYGEN SATURATION: 92 % | SYSTOLIC BLOOD PRESSURE: 135 MMHG | WEIGHT: 175 LBS

## 2024-11-25 DIAGNOSIS — I10 PRIMARY HYPERTENSION: ICD-10-CM

## 2024-11-25 DIAGNOSIS — R00.2 PALPITATIONS: Primary | ICD-10-CM

## 2024-11-25 DIAGNOSIS — R00.2 PALPITATIONS: ICD-10-CM

## 2024-11-25 DIAGNOSIS — Z82.49 FAMILY HISTORY OF PREMATURE CAD: ICD-10-CM

## 2024-11-25 PROCEDURE — 1159F MED LIST DOCD IN RCRD: CPT | Performed by: NURSE PRACTITIONER

## 2024-11-25 PROCEDURE — 3075F SYST BP GE 130 - 139MM HG: CPT | Performed by: NURSE PRACTITIONER

## 2024-11-25 PROCEDURE — 1160F RVW MEDS BY RX/DR IN RCRD: CPT | Performed by: NURSE PRACTITIONER

## 2024-11-25 PROCEDURE — 93246 EXT ECG>7D<15D RECORDING: CPT

## 2024-11-25 PROCEDURE — 3078F DIAST BP <80 MM HG: CPT | Performed by: NURSE PRACTITIONER

## 2024-11-25 PROCEDURE — 99214 OFFICE O/P EST MOD 30 MIN: CPT | Performed by: NURSE PRACTITIONER

## 2024-11-25 NOTE — PROGRESS NOTES
[facility] Heart Monitor Documentation    Tessa De La Rosa  1960  [id number]  11/25/24      [] ZIO XT Patch  Model [id number] Prescribed for  Days    Serial Number: (N + 9 Digits) N   Apply-By Date on Box:   USPS Tracking Number:   USPS Tracking        [] [employer] MINI PLUS Mobile Cardiac Telemetry  Model BGMINIPLUS Prescribed for  Days    Serial Number: (BGM + 7 Digits) BGM  Shipped-By Date on Box:   UPS Tracking Number: 1Z  UPS Tracking      [x] [employer] MINI Holter Monitor  Model BGMINIEL Prescribed for 14 Days    Serial Number: (7 Digits) [id number]  Shipped-By Date on Box:   UPS Tracking Number: 1Z  UPS Tracking        This monitor was applied to the patient's chest and checked for proper functioning.  Ms. Tessa De La Rosa was instructed in the proper use of this monitor.  She was given the opportunity to ask questions and left the office with the device 's instruction manual.    Meera Ferris MA, 11:33 EST, 11/25/24                  [facility] 8[facility]

## 2024-12-04 RX ORDER — BISOPROLOL FUMARATE 5 MG/1
5 TABLET, FILM COATED ORAL DAILY
Qty: 90 TABLET | Refills: 3 | Status: SHIPPED | OUTPATIENT
Start: 2024-12-04

## 2024-12-04 NOTE — TELEPHONE ENCOUNTER
Last visit 11/25/24, next follow up 1/30/25 with [facility] office.  Please refill if appropriate, deny, or re-route.  Roby Washington MA

## 2024-12-16 ENCOUNTER — TELEPHONE (OUTPATIENT)
Dept: CARDIOLOGY | Facility: HOSPITAL | Age: 64
End: 2024-12-16
Payer: MEDICARE

## 2024-12-16 DIAGNOSIS — G47.9 SLEEP DISTURBANCES: Primary | ICD-10-CM

## 2024-12-16 NOTE — TELEPHONE ENCOUNTER
Contacted patient with monitor results. Ectopy has improved. 2 nocturnal pauses, longest 3.3 seconds.  She is asymptomatic with this.  Due to her chronic sleep issues we will go ahead and refer to sleep medicine.  She does not have any significant bradycardia during the day.  Patient to keep follow-up with [facility].  She had no further questions or concerns

## 2024-12-20 ENCOUNTER — TELEPHONE (OUTPATIENT)
Dept: CARDIOLOGY | Facility: HOSPITAL | Age: 64
End: 2024-12-20
Payer: MEDICARE

## 2024-12-20 NOTE — TELEPHONE ENCOUNTER
Left message for patient to call my direct line back to discuss her cardiac testing.  As the CT calcium score has yet to be completed [facility] has left her several voicemail's.

## 2024-12-23 NOTE — TELEPHONE ENCOUNTER
Spoke with patient, she is going to reach out to [facility] to schedule testing first of the new year.

## 2025-01-30 ENCOUNTER — LAB (OUTPATIENT)
Dept: LAB | Facility: HOSPITAL | Age: 65
End: 2025-01-30
Payer: MEDICARE

## 2025-01-30 ENCOUNTER — OFFICE VISIT (OUTPATIENT)
Dept: CARDIOLOGY | Facility: CLINIC | Age: 65
End: 2025-01-30
Payer: MEDICARE

## 2025-01-30 VITALS
HEIGHT: 64 IN | SYSTOLIC BLOOD PRESSURE: 142 MMHG | BODY MASS INDEX: 29.81 KG/M2 | DIASTOLIC BLOOD PRESSURE: 84 MMHG | HEART RATE: 60 BPM | WEIGHT: 174.6 LBS

## 2025-01-30 DIAGNOSIS — Z82.49 FAMILY HISTORY OF PREMATURE CAD: ICD-10-CM

## 2025-01-30 DIAGNOSIS — E78.00 HYPERCHOLESTEROLEMIA: ICD-10-CM

## 2025-01-30 DIAGNOSIS — E78.1 HYPERTRIGLYCERIDEMIA: ICD-10-CM

## 2025-01-30 DIAGNOSIS — K76.0 NONALCOHOLIC FATTY LIVER DISEASE: ICD-10-CM

## 2025-01-30 DIAGNOSIS — I10 PRIMARY HYPERTENSION: ICD-10-CM

## 2025-01-30 DIAGNOSIS — I47.10 PSVT (PAROXYSMAL SUPRAVENTRICULAR TACHYCARDIA): ICD-10-CM

## 2025-01-30 DIAGNOSIS — K21.9 GASTROESOPHAGEAL REFLUX DISEASE WITHOUT ESOPHAGITIS: ICD-10-CM

## 2025-01-30 DIAGNOSIS — I49.1 PREMATURE ATRIAL CONTRACTIONS: ICD-10-CM

## 2025-01-30 DIAGNOSIS — R00.2 PALPITATIONS: ICD-10-CM

## 2025-01-30 DIAGNOSIS — G47.30 SLEEP APNEA, UNSPECIFIED TYPE: ICD-10-CM

## 2025-01-30 DIAGNOSIS — E03.9 HYPOTHYROIDISM (ACQUIRED): ICD-10-CM

## 2025-01-30 DIAGNOSIS — I10 PRIMARY HYPERTENSION: Primary | ICD-10-CM

## 2025-01-30 DIAGNOSIS — I49.3 PREMATURE VENTRICULAR CONTRACTIONS: ICD-10-CM

## 2025-01-30 LAB
ALBUMIN SERPL-MCNC: 4.6 G/DL (ref 3.5–5.2)
ALBUMIN/GLOB SERPL: 1.4 G/DL
ALP SERPL-CCNC: 113 U/L (ref 39–117)
ALT SERPL W P-5'-P-CCNC: 49 U/L (ref 1–33)
ANION GAP SERPL CALCULATED.3IONS-SCNC: 9.8 MMOL/L (ref 5–15)
AST SERPL-CCNC: 40 U/L (ref 1–32)
BASOPHILS # BLD AUTO: 0.11 10*3/MM3 (ref 0–0.2)
BASOPHILS NFR BLD AUTO: 1.2 % (ref 0–1.5)
BILIRUB SERPL-MCNC: 1.1 MG/DL (ref 0–1.2)
BUN SERPL-MCNC: 14 MG/DL (ref 8–23)
BUN/CREAT SERPL: 16.1 (ref 7–25)
CALCIUM SPEC-SCNC: 9.7 MG/DL (ref 8.6–10.5)
CHLORIDE SERPL-SCNC: 105 MMOL/L (ref 98–107)
CHOLEST SERPL-MCNC: 170 MG/DL (ref 0–200)
CO2 SERPL-SCNC: 30.2 MMOL/L (ref 22–29)
CREAT SERPL-MCNC: 0.87 MG/DL (ref 0.57–1)
DEPRECATED RDW RBC AUTO: 44.5 FL (ref 37–54)
EGFRCR SERPLBLD CKD-EPI 2021: 74 ML/MIN/1.73
EOSINOPHIL # BLD AUTO: 0.16 10*3/MM3 (ref 0–0.4)
EOSINOPHIL NFR BLD AUTO: 1.8 % (ref 0.3–6.2)
ERYTHROCYTE [DISTWIDTH] IN BLOOD BY AUTOMATED COUNT: 13.2 % (ref 12.3–15.4)
GLOBULIN UR ELPH-MCNC: 3.2 GM/DL
GLUCOSE SERPL-MCNC: 103 MG/DL (ref 65–99)
HCT VFR BLD AUTO: 51.7 % (ref 34–46.6)
HDLC SERPL-MCNC: 46 MG/DL (ref 40–60)
HGB BLD-MCNC: 16.7 G/DL (ref 12–15.9)
IMM GRANULOCYTES # BLD AUTO: 0.03 10*3/MM3 (ref 0–0.05)
IMM GRANULOCYTES NFR BLD AUTO: 0.3 % (ref 0–0.5)
LDLC SERPL CALC-MCNC: 99 MG/DL (ref 0–100)
LDLC/HDLC SERPL: 2.08 {RATIO}
LYMPHOCYTES # BLD AUTO: 2.38 10*3/MM3 (ref 0.7–3.1)
LYMPHOCYTES NFR BLD AUTO: 26.4 % (ref 19.6–45.3)
MAGNESIUM SERPL-MCNC: 2.1 MG/DL (ref 1.6–2.4)
MCH RBC QN AUTO: 29.7 PG (ref 26.6–33)
MCHC RBC AUTO-ENTMCNC: 32.3 G/DL (ref 31.5–35.7)
MCV RBC AUTO: 91.8 FL (ref 79–97)
MONOCYTES # BLD AUTO: 0.67 10*3/MM3 (ref 0.1–0.9)
MONOCYTES NFR BLD AUTO: 7.4 % (ref 5–12)
NEUTROPHILS NFR BLD AUTO: 5.68 10*3/MM3 (ref 1.7–7)
NEUTROPHILS NFR BLD AUTO: 62.9 % (ref 42.7–76)
NRBC BLD AUTO-RTO: 0 /100 WBC (ref 0–0.2)
PLATELET # BLD AUTO: 264 10*3/MM3 (ref 140–450)
PMV BLD AUTO: 11.9 FL (ref 6–12)
POTASSIUM SERPL-SCNC: 5.1 MMOL/L (ref 3.5–5.2)
PROT SERPL-MCNC: 7.8 G/DL (ref 6–8.5)
RBC # BLD AUTO: 5.63 10*6/MM3 (ref 3.77–5.28)
SODIUM SERPL-SCNC: 145 MMOL/L (ref 136–145)
T4 FREE SERPL-MCNC: 1.81 NG/DL (ref 0.92–1.68)
TRIGL SERPL-MCNC: 141 MG/DL (ref 0–150)
TSH SERPL DL<=0.05 MIU/L-ACNC: 1.66 UIU/ML (ref 0.27–4.2)
VLDLC SERPL-MCNC: 25 MG/DL (ref 5–40)
WBC NRBC COR # BLD AUTO: 9.03 10*3/MM3 (ref 3.4–10.8)

## 2025-01-30 PROCEDURE — 80061 LIPID PANEL: CPT

## 2025-01-30 PROCEDURE — 85025 COMPLETE CBC W/AUTO DIFF WBC: CPT

## 2025-01-30 PROCEDURE — 86141 C-REACTIVE PROTEIN HS: CPT

## 2025-01-30 PROCEDURE — 84439 ASSAY OF FREE THYROXINE: CPT

## 2025-01-30 PROCEDURE — 84443 ASSAY THYROID STIM HORMONE: CPT

## 2025-01-30 PROCEDURE — 83735 ASSAY OF MAGNESIUM: CPT

## 2025-01-30 PROCEDURE — 36415 COLL VENOUS BLD VENIPUNCTURE: CPT

## 2025-01-30 PROCEDURE — 80053 COMPREHEN METABOLIC PANEL: CPT

## 2025-01-30 NOTE — PROGRESS NOTES
Subjective   Tessa De La Rosa is a 64 y.o. female seen in the office today to establish cardiac care locally.  She has been followed by cardiology in [place] and wants to switch due to travel.    PMH includes: HTN, fatty liver, GERD, PACs and PVCs.    Holter monitor in 2023 showed frequent PACs (12.5%) and occasional PVCs (2.6%). Multiple runs of brief SVT/AT, longest 11 beats.  2 pauses 3.3 seconds.  Echo showed normal LVEF, borderline LVH, grade 1 diastolic dysfunction.  Recent sleep study showed mild apnea with plan to start CPAP therapy and followed by Dr. Ramirez.   Patient has been ordered calcium score awaiting appointment to be set up.    Currently she finds palpitations well-managed with medication regimen and and maintaining limited caffeine intake.      Chief Complaint   Patient presents with    Establish Care     Referred per [facility] [place] , patient needed closer location     Palpitations     Patient reports feeling palpitations on occasion , but they have improved     LABS and TESTING     LABS- Results 2024 in chart  Holter monitor-  Calcium Score - has been ordered per =Louise Vickers / Cardiology . Patient is to schedule at [facility]    Sleep Apnea     Patient recently has Overnight Sleep study per Dr Bethea . She is awaiting CPAP machine    Med Refill     No refills needed today       Initial cardiac evaluation; palpitations and hypertension      Cardiac History  Past Surgical History:   Procedure Laterality Date    BREAST BIOPSY       SECTION      RHINOPLASTY         Current Outpatient Medications   Medication Sig Dispense Refill    amLODIPine (NORVASC) 5 MG tablet Take 1 tablet by mouth Daily.      benazepril (LOTENSIN) 20 MG tablet Take 1 tablet by mouth Daily.      bisoprolol (ZEBeta) 5 MG tablet TAKE 1 TABLET BY MOUTH DAILY 90 tablet 3    levothyroxine (SYNTHROID, LEVOTHROID) 88 MCG tablet Take 1 tablet by mouth Daily.      omeprazole  (priLOSEC) 20 MG capsule Take 1 capsule by mouth Daily.       No current facility-administered medications for this visit.       Patient has no known allergies.    Past Medical History:   Diagnosis Date    Abnormal ECG 2023    Arrhythmia 2023    GERD (gastroesophageal reflux disease)     HTN (hypertension)     Hypothyroid     Sleep apnea        Social History     Socioeconomic History    Marital status:    Tobacco Use    Smoking status: Never     Passive exposure: Never    Smokeless tobacco: Never   Vaping Use    Vaping status: Never Used   Substance and Sexual Activity    Alcohol use: Never    Drug use: Never    Sexual activity: Yes     Partners: Male     Birth control/protection: Post-menopausal       Family History   Problem Relation Age of Onset    Heart failure Mother     Arrhythmia Mother     Cancer Father     Heart attack Father             No Known Problems Sister     No Known Problems Brother     No Known Problems Maternal Grandmother     No Known Problems Maternal Grandfather     No Known Problems Paternal Grandmother     No Known Problems Paternal Grandfather        Review of Systems   Constitutional:  Negative for activity change, appetite change, diaphoresis and fatigue.   HENT:  Negative for trouble swallowing.    Respiratory:  Positive for apnea. Negative for shortness of breath and wheezing.    Cardiovascular:  Positive for palpitations. Negative for chest pain and leg swelling.   Gastrointestinal:  Negative for blood in stool and nausea.   Endocrine: Negative for polydipsia, polyphagia and polyuria.   Genitourinary:  Negative for dysuria and hematuria.   Musculoskeletal:  Negative for myalgias.   Skin:  Negative for pallor.   Neurological:  Negative for dizziness, seizures, speech difficulty, numbness and headaches.   Hematological:  Negative for adenopathy. Does not bruise/bleed easily.   Psychiatric/Behavioral:  Positive for sleep disturbance (insomnia, uses OTC  "medication). The patient is not nervous/anxious.        BP Readings from Last 5 Encounters:   01/30/25 142/84   11/25/24 135/72   11/20/23 135/76   05/19/23 139/72   02/16/23 142/82       Wt Readings from Last 5 Encounters:   01/30/25 79.2 kg (174 lb 9.6 oz)   11/25/24 79.4 kg (175 lb)   11/20/23 78.6 kg (173 lb 3.2 oz)   05/19/23 76.3 kg (168 lb 4 oz)   03/28/23 77.1 kg (169 lb 15.6 oz)          Objective     Labs 9/24/2024 per PCP: TSH 0.86, vitamin D49.7, free T42.05, GGT 26, direct bilirubin 0.3, total cholesterol 195, triglycerides 158, HDL 50, , glucose 105, BUN 14, creatinine 0.8, sodium 142, testing 5, chloride 102, CO2 29, calcium 10, total protein 8.4, albumin 4.7, AST 58, , total bili 2.2, GFR 82, ALT 71    /84 (BP Location: Right arm, Patient Position: Sitting, Cuff Size: Adult)   Pulse 60   Ht 162.6 cm (64\")   Wt 79.2 kg (174 lb 9.6 oz)   BMI 29.97 kg/m²     Physical Exam  Constitutional:       Appearance: Normal appearance.   HENT:      Head: Normocephalic.      Mouth/Throat:      Pharynx: Oropharynx is clear.   Eyes:      Conjunctiva/sclera: Conjunctivae normal.   Neck:      Vascular: No carotid bruit.   Cardiovascular:      Rate and Rhythm: Normal rate and regular rhythm.      Pulses: Normal pulses.      Heart sounds: No murmur heard.  Pulmonary:      Effort: Pulmonary effort is normal.      Breath sounds: Normal breath sounds.   Abdominal:      General: Bowel sounds are normal.      Palpations: Abdomen is soft.      Tenderness: There is no abdominal tenderness.   Musculoskeletal:      Right lower leg: No edema.      Left lower leg: No edema.   Skin:     General: Skin is warm and dry.      Coloration: Skin is not jaundiced or pale.   Neurological:      General: No focal deficit present.      Mental Status: She is oriented to person, place, and time.   Psychiatric:         Mood and Affect: Mood normal.         Behavior: Behavior normal.           ECG 12 Lead    Date/Time: " 1/30/2025 11:29 AM  Performed by: Carlyn Devi APRN    Authorized by: Carlyn Devi APRN  Comparison: compared with previous ECG from 2/28/2023  Comparison to previous ECG: Prior EKG sinus, short VA, PACs and PVCs  Rhythm: sinus rhythm  Ectopy: atrial premature contractions  Rate: normal  BPM: 60  Comments: Current EKG VA interval 128 ms, QT/QTc 438/438 ms          Assessment & Plan     Diagnoses and all orders for this visit:    1. Primary hypertension (Primary)    2. Gastroesophageal reflux disease without esophagitis    3. Nonalcoholic fatty liver disease    4. Hypothyroidism (acquired)    5. Palpitations  -     ECG 12 Lead    6. Premature atrial contractions  -     ECG 12 Lead    7. Premature ventricular contractions  -     ECG 12 Lead    8. PSVT (paroxysmal supraventricular tachycardia)  -     ECG 12 Lead    9. Sleep apnea, unspecified type    Other orders  -     LABS SCANNED    Hypertension  -BP has been in normal range per home monitor  -BP slightly elevated today.  No medication changes advised.  Recommend surveillance of vital signs and call if not at goal  -Continue Lotensin 20 mg daily, amlodipine 5 mg daily, Zebeta 5 mg daily  -DASH diet encouraged  -Plan CPAP for management of sleep apnea    GERD  -Denies recent symptoms  -On omeprazole    Fatty liver disease  -On low-fat diet  -Recheck labs to include LFTs    Hypothyroidism  -Admits to taking Synthroid appropriately  -Recheck TSH, T4    PAC/PVC/PSVT  -Results of most recent cardiac monitor reviewed with patient  -Palpitations remain managed  -Continue beta-blocker  -Handout on heart palpitations and triggers to avoid provided  -Plan CPAP for management sleep apnea    Sleep apnea  -Plans to start CPAP, followed by pulmonologist    Of note patient will be having coronary calcium score.  If elevated then consider stricter lipid management, consider nuclear stress test due to strong family history premature heart disease.    6-month follow-up visit  scheduled.               Electronically signed by SANDY Mendoza,  January 30, 2025 11:35 EST    Dictated Utilizing Dragon Dictation: Part of this note may be an electronic transcription/translation of spoken language to printed text using the Dragon Dictation System.

## 2025-01-31 LAB — CRP SERPL-MCNC: 0.23 MG/DL (ref 0.01–0.5)

## 2025-02-05 ENCOUNTER — TELEPHONE (OUTPATIENT)
Dept: CARDIOLOGY | Facility: HOSPITAL | Age: 65
End: 2025-02-05
Payer: MEDICARE

## 2025-02-05 NOTE — PROGRESS NOTES
Pt aware of lab results and recommendations that the elevated RBC's could be related to sleep apnea. She said they had notified her yesterday that they are waiting on insurance approval to set up the CPAP. Discussed diet changes to better control his glucose. To discuss checking A1C with PCP on her next labs. Understanding voiced.  Faxing results to PCP.

## 2025-03-07 ENCOUNTER — TELEPHONE (OUTPATIENT)
Dept: CARDIOLOGY | Facility: HOSPITAL | Age: 65
End: 2025-03-07
Payer: MEDICARE

## 2025-03-10 ENCOUNTER — RESULTS FOLLOW-UP (OUTPATIENT)
Dept: CARDIOLOGY | Facility: HOSPITAL | Age: 65
End: 2025-03-10
Payer: MEDICARE

## 2025-03-10 RX ORDER — ASPIRIN 81 MG/1
81 TABLET ORAL DAILY
Start: 2025-03-10

## 2025-03-10 NOTE — TELEPHONE ENCOUNTER
"Contacted patient with CCS results. , discuss high risk for ASCVD events and recommended aspirin 81 mg daily and statin.  She is very hesitant about statin therapy. Concerned about myalgias and potential liver dysfunction. We had a long discussion about risk versus benefits of statin and risk of plaque rupture.  She agrees to only take aspirin at this time and will \"think\" about it and let us know.   "

## 2025-03-20 ENCOUNTER — TELEPHONE (OUTPATIENT)
Dept: CARDIOLOGY | Facility: CLINIC | Age: 65
End: 2025-03-20

## 2025-03-20 DIAGNOSIS — K76.0 NONALCOHOLIC FATTY LIVER DISEASE: Primary | ICD-10-CM

## 2025-03-20 DIAGNOSIS — E78.1 HYPERTRIGLYCERIDEMIA: ICD-10-CM

## 2025-03-20 DIAGNOSIS — E78.00 HYPERCHOLESTEROLEMIA: ICD-10-CM

## 2025-03-20 NOTE — TELEPHONE ENCOUNTER
Caller: Tessa De La Rosa    Relationship to patient: Self    Best call back number: [phone]    Chief complaint: PATIENT WANTS TO DISCUSS MEDICATION CHANGES, PLEASE ADVISE    Type of visit: FOLLOW-UP    Requested date: ASAP     If rescheduling, when is the original appointment: 8.6.25

## 2025-03-20 NOTE — TELEPHONE ENCOUNTER
I spoke with patient and she  had Calcium score done elevated numbers , She has been advised to start Atorvastatin 80 mg daily ans Aspirin 81 mg daily . She would like to discuss with you an alternative to statin   , PCSK9  inhibitors  . She is scheduled  for follow up with you 4- at 3:00 pm

## 2025-03-21 NOTE — TELEPHONE ENCOUNTER
I spoke with patient and she is fine with cancelling appointment for follow up 4-  and  having referral to Lipid clinic. She is aware  of follow up appointment scheduled August 2025

## 2025-04-11 ENCOUNTER — SPECIALTY PHARMACY (OUTPATIENT)
Dept: CARDIOLOGY | Facility: HOSPITAL | Age: 65
End: 2025-04-11
Payer: MEDICARE

## 2025-04-11 ENCOUNTER — HOSPITAL ENCOUNTER (OUTPATIENT)
Dept: CARDIOLOGY | Facility: HOSPITAL | Age: 65
Discharge: HOME | End: 2025-04-11
Payer: MEDICARE

## 2025-04-11 DIAGNOSIS — Z82.49 FAMILY HISTORY OF PREMATURE CAD: ICD-10-CM

## 2025-04-11 DIAGNOSIS — R93.1 ELEVATED CORONARY ARTERY CALCIUM SCORE: Primary | ICD-10-CM

## 2025-04-11 RX ORDER — ALIROCUMAB 75 MG/ML
75 INJECTION, SOLUTION SUBCUTANEOUS
Qty: 6 ML | Refills: 2 | Status: SHIPPED | OUTPATIENT
Start: 2025-04-11

## 2025-04-11 NOTE — PROGRESS NOTES
[facility] Patient Management Program  Lipid Management Program - PCSK9i Initial Assessment     Tessa De La Rosa is a 64 y.o. female referred by their provider, Carlyn Devi, to the Hyperlipidemia Patient Management program offered by [facility] for Lipid Management.  An initial outreach was conducted, including assessment of therapy appropriateness and specialty medication education for Praluent. The patient was introduced to services offered by [facility], including: regular assessments, refill coordination, curbside pick-up or mail order delivery options, prior authorization maintenance, and financial assistance programs as applicable. The patient was also provided with contact information for the pharmacy team.     Tessa De La Rosa is  treated for elevated coronary calcium score and currently takes nothing for cholesterol.  In the past, Pt has been prescribed atorvastatin 80mg but chose not to take due to risk of liver injury with her history of non-alcoholic fatty liver disease.  The patient denies any allergies to latex.      Insurance Coverage & Financial Support  Rx Medicare Part D + Auth0 Christopher     Relevant Past Medical History and Comorbidities  Relevant medical history and concomitant health conditions were discussed with the patient. The patient's chart has been reviewed for relevant past medical history and comorbid conditions and updated as necessary.  Past Medical History:   Diagnosis Date    Abnormal ECG February2023    Arrhythmia February 2023    GERD (gastroesophageal reflux disease)     HTN (hypertension)     Hypothyroid     Sleep apnea      Social History     Socioeconomic History    Marital status:    Tobacco Use    Smoking status: Never     Passive exposure: Never    Smokeless tobacco: Never   Vaping Use    Vaping status: Never Used   Substance and Sexual Activity     Alcohol use: Never    Drug use: Never    Sexual activity: Yes     Partners: Male     Birth control/protection: Post-menopausal            Allergies  Known allergies and reactions were discussed with the patient. The patient's chart has been reviewed for  allergy information and updated as necessary.   No Known Allergies         Relevant Laboratory Values  Relevant laboratory values were discussed with the patient. The following specialty medication dose adjustment(s) are recommended: See plan, if applicable   Lab Results   Component Value Date    CHOL 170 01/30/2025    TRIG 141 01/30/2025    HDL 46 01/30/2025    LDL 99 01/30/2025       Current Medication List  This medication list has been reviewed with the patient and evaluated for any interactions or necessary modifications/recommendations, and updated to include all prescription medications, OTC medications, and supplements the patient is currently taking.  This list reflects what is contained in the patient's profile, which has also been marked as reviewed to communicate to other providers it is the most up to date version of the patient's current medication therapy.     Current Outpatient Medications:     Alirocumab (Praluent) 75 MG/ML solution auto-injector, Inject 1 mL under the skin into the appropriate area as directed Every 14 (Fourteen) Days., Disp: 6 mL, Rfl: 2    amLODIPine (NORVASC) 5 MG tablet, Take 1 tablet by mouth Daily., Disp: , Rfl:     aspirin 81 MG EC tablet, Take 1 tablet by mouth Daily., Disp: , Rfl:     benazepril (LOTENSIN) 20 MG tablet, Take 1 tablet by mouth Daily., Disp: , Rfl:     bisoprolol (ZEBeta) 5 MG tablet, TAKE 1 TABLET BY MOUTH DAILY, Disp: 90 tablet, Rfl: 3    levothyroxine (SYNTHROID, LEVOTHROID) 88 MCG tablet, Take 1 tablet by mouth Daily., Disp: , Rfl:     omeprazole (priLOSEC) 20 MG capsule, Take 1 capsule by mouth Daily., Disp: , Rfl:          Drug Interactions  None with Praluent     Adherence and  Self-Administration  Adherence related to the patient's specialty therapy was discussed with the patient. The Adherence segment of this outreach has been reviewed and updated.              Methods for Supporting Patient Adherence and/or Self-Administration: see plan, if applicable     [unfilled]    Goals of Therapy  Goals related to the patient's specialty therapy were discussed with the patient. The Patient Goals segment of this outreach has been reviewed and updated.   Goals Addressed Today    None         Medication Assessment & Plan  Medication Therapy Changes: Patient started today on Praluent 75mg every 2 weeks.   Injection training and medication education provided by Nidia Aly, PharmD Candidate.    Pt administered in right side of her abdomen.   Welcome information and patient satisfaction survey to be sent by specialty pharmacy team with patient's initial fill.  Related Plans, Therapy Recommendations, or Therapy Problems to Be Addressed: Counseled Pt on benefits of statin and recommended she try that first but she has already discussed with provider and doesn't want to try it.   Patient will need lipid panel in 6 weeks, order placed.   Patient will continue regular follow-up with cardiology.   Patient will follow up with specialty pharmacy mail-out services. Care Coordinator to set up future refill outreaches, coordinate prescription delivery, and escalate clinical questions to pharmacist.  Pharmacist to perform regular assessments no more than (6) months from the previous assessment. Will follow-up in 6 months, or sooner if needed.    Initial Education Provided for Specialty Medication  The patient has been provided with the following education and any applicable administration techniques (i.e. self-injection) have been demonstrated for the therapies indicated. All questions and concerns have been addressed prior to the patient receiving the medication, and the patient has verbalized comprehension of  the education and any materials provided. Additional patient education shall be provided and documented upon request by the patient, provider, or payer.    PRALUENT® (alirocumab)  Medication Expectations   Why am I taking this medication? You are taking Praluent to lower your “bad” cholesterol (LDL-C). This medication can be used in adults with high blood cholesterol including primary hyperlipidemia and familial hypercholesterolemia. It can help reduce the risk of heart attack and stroke.      What should I expect while on this medication? You should expect to see your cholesterol improve over time. Specifically, you should see your LDL-C decrease.    How does the medication work? Praluent works by blocking a protein called PCSK9 that contributes to high levels of bad cholesterol and it helps increase your liver's ability to remove bad cholesterol from your blood.     How long will I be on this medication for? The amount of time you will be on this medication will be determined by your doctor based on your cholesterol and/or your risk of having a cardiac event. You will most likely be on this medication or another cholesterol medication throughout your lifetime. Do not abruptly stop this medication without talking to your doctor first.    How do I take this medication? Take as directed on your prescription label. Praluent is injected under the skin (subcutaneously) of your stomach, thigh, or upper arm. This medication is usually given one or twice a month.     What are some possible side effects? The most common side effects of Praluent include redness, itching, swelling, or pain/tenderness at the injection site, symptoms of the common cold, and flu or flu-like symptoms. Praluent can also cause diarrhea and muscle spasms.    What happens if I miss a dose? If you miss a dose, take it as soon as you remember if it is within 7 days from the usual day of administration then resume your original schedule. If it is beyond  7 days, skip the missed dose and resume your normal dosing schedule.      Medication Safety   What are things I should warn my doctor immediately about? Talk to your doctor if you are pregnant, planning to become pregnant, or breastfeeding. Stop the medication and tell your doctor or seek emergency medical help if you notice any signs/symptoms of an allergic reaction (severe rash, redness, hives, severe itching, trouble breathing, or swelling of the face, lips, or tongue). Do not take Praluent if you have an allergy to alirocumab or any of the ingredients in Praluent.    What are things that I should be cautious of? Be cautious of any side effects from this medication. Talk to your doctor if any new ones develop or aren't getting better.   What are some medications that can interact with this one? There are no known significant drug interactions with Praluent. Always tell your doctor or pharmacist immediately if you start taking any new medications, including over-the-counter medications, vitamins, and herbal supplements.      Medication Storage/Handling   How should I handle this medication? Do not shake or expose the pen to extreme heat or direct sunlight. Keep this medication out of reach of pets/children.    How does this medication need to be stored? Store unused pens in the refrigerator in the original carton to protect from light. Allow medication to warm at room temperature prior to administration. If needed, Praluent may be kept at room temperature in the original carton for up to 30 days. Do not freeze.    How should I dispose of this medication? The device is a single-dose disposable pen and should be discarded in a sharps container after use. The blue caps should also be placed in a sharps container. If you do not own a sharps container, you may use a household container made of heavy-duty plastic with a tight-fitting lid that is leak resistant (e.g., heavty-duty plastic laundry detergent bottle).  If  your doctor decides to stop this medication, take to your local police station for proper disposal. Some pharmacies also have take-back bins for medication drop-off.      Resources/Support   How can I remind myself to take this medication? You can download reminder apps to help you manage your refills. You may also set an alarm on your phone to remind you to take your dose.    Is financial support available?  [employer] can provide co-pay cards if you have commercial insurance or patient assistance if you have Medicare or no insurance.    Which vaccines are recommended for me? Talk to your doctor about these vaccines: Flu, Coronavirus (COVID-19), Pneumococcal (pneumonia), Tdap, Hepatitis B, Zoster (shingles)      Attestation      I attest the patient was actively involved in and has agreed to the above plan of care. If the prescribed therapy is at any point deemed not appropriate based on the current or future assessments, a consultation will be initiated with the patient's specialty care provider to determine the best course of action. The revised plan of therapy will be documented along with any required assessments and/or additional patient education provided.     Denise Sanz, PharmD  4/11/2025  12:10 EDT

## 2025-04-15 ENCOUNTER — OFFICE VISIT (OUTPATIENT)
Dept: CARDIOLOGY | Facility: CLINIC | Age: 65
End: 2025-04-15
Payer: MEDICARE

## 2025-04-15 VITALS
HEART RATE: 70 BPM | BODY MASS INDEX: 29.19 KG/M2 | SYSTOLIC BLOOD PRESSURE: 130 MMHG | WEIGHT: 171 LBS | DIASTOLIC BLOOD PRESSURE: 70 MMHG | HEIGHT: 64 IN

## 2025-04-15 DIAGNOSIS — Z82.49 FAMILY HISTORY OF PREMATURE CAD: ICD-10-CM

## 2025-04-15 DIAGNOSIS — I10 PRIMARY HYPERTENSION: ICD-10-CM

## 2025-04-15 DIAGNOSIS — R93.1 ELEVATED CORONARY ARTERY CALCIUM SCORE: Primary | ICD-10-CM

## 2025-04-15 DIAGNOSIS — K21.9 GASTROESOPHAGEAL REFLUX DISEASE WITHOUT ESOPHAGITIS: ICD-10-CM

## 2025-04-15 DIAGNOSIS — R91.1 PULMONARY NODULE, RIGHT: ICD-10-CM

## 2025-04-15 DIAGNOSIS — G47.30 SLEEP APNEA, UNSPECIFIED TYPE: ICD-10-CM

## 2025-04-15 DIAGNOSIS — E78.1 HYPERTRIGLYCERIDEMIA: ICD-10-CM

## 2025-04-15 DIAGNOSIS — E78.00 HYPERCHOLESTEROLEMIA: ICD-10-CM

## 2025-04-15 DIAGNOSIS — K76.0 NONALCOHOLIC FATTY LIVER DISEASE: ICD-10-CM

## 2025-04-15 PROCEDURE — 1159F MED LIST DOCD IN RCRD: CPT | Performed by: NURSE PRACTITIONER

## 2025-04-15 PROCEDURE — 3075F SYST BP GE 130 - 139MM HG: CPT | Performed by: NURSE PRACTITIONER

## 2025-04-15 PROCEDURE — 3078F DIAST BP <80 MM HG: CPT | Performed by: NURSE PRACTITIONER

## 2025-04-15 PROCEDURE — 99214 OFFICE O/P EST MOD 30 MIN: CPT | Performed by: NURSE PRACTITIONER

## 2025-04-15 PROCEDURE — 1160F RVW MEDS BY RX/DR IN RCRD: CPT | Performed by: NURSE PRACTITIONER

## 2025-04-15 NOTE — PROGRESS NOTES
Chief Complaint   Patient presents with    Follow-up     Cardiac management    LABS     LABS- current labs January and 2025 , results in chart  CT Cardiac Calcium Score - 2025 results in chart    Med Refill     Patient states no refills needed today .       Hernán De La Rosa is a 64 y.o. female initially seen in 2025 for cardiac consultation.  PMH includes HTN, fatty liver, GERD, PACs and PVCs.  In  cardiac monitor showed 12.5% PACs and occasional PVCs at 2.6%, multiple runs brief SVT/AT longest 11 beats and 2 pauses of 3.3 seconds.  Echocardiogram normal LVEF.  Recent sleep study revealed mild apnea with CPAP recommended.  On 3/5/2025 CT calcium score result 408 indicating high risk for CAD.    Today she returns to the office for follow-up visit.  She has decided to postpone CPAP unless symptoms worsen given her anemia is mild.  She denies recent heart palpitations or chest pain.  Last week PCSK9 inhibitor started without issues.  She admits to feeling a tightness sensation in the lower rib cage area that tends to develop after prolonged standing, sometimes on the left sometimes on the right or both.    Cardiac History:    Past Surgical History:   Procedure Laterality Date    BREAST BIOPSY       SECTION      RHINOPLASTY         Current Outpatient Medications   Medication Sig Dispense Refill    Alirocumab (Praluent) 75 MG/ML solution auto-injector Inject 1 mL under the skin into the appropriate area as directed Every 14 (Fourteen) Days. 6 mL 2    amLODIPine (NORVASC) 5 MG tablet Take 1 tablet by mouth Daily.      aspirin 81 MG EC tablet Take 1 tablet by mouth Daily.      benazepril (LOTENSIN) 20 MG tablet Take 1 tablet by mouth Daily.      bisoprolol (ZEBeta) 5 MG tablet TAKE 1 TABLET BY MOUTH DAILY 90 tablet 3    levothyroxine (SYNTHROID, LEVOTHROID) 88 MCG tablet Take 1 tablet by mouth Daily.      omeprazole (priLOSEC) 20 MG capsule Take 1 capsule by mouth Daily.        No current facility-administered medications for this visit.       Patient has no known allergies.    Past Medical History:   Diagnosis Date    Abnormal ECG 2023    Arrhythmia 2023    Fatty liver     GERD (gastroesophageal reflux disease)     HTN (hypertension)     Hypothyroid     Sleep apnea        Social History     Socioeconomic History    Marital status:    Tobacco Use    Smoking status: Never     Passive exposure: Never    Smokeless tobacco: Never   Vaping Use    Vaping status: Never Used   Substance and Sexual Activity    Alcohol use: Never    Drug use: Never    Sexual activity: Yes     Partners: Male     Birth control/protection: Post-menopausal       Family History   Problem Relation Age of Onset    Heart failure Mother     Arrhythmia Mother     Cancer Father     Heart attack Father             No Known Problems Sister     No Known Problems Brother     No Known Problems Maternal Grandmother     No Known Problems Maternal Grandfather     No Known Problems Paternal Grandmother     No Known Problems Paternal Grandfather        Review of Systems   Constitutional: Negative for diaphoresis and malaise/fatigue.   Cardiovascular:  Negative for chest pain, leg swelling, near-syncope and palpitations.   Respiratory:  Positive for shortness of breath and sleep disturbances due to breathing (mild).    Hematologic/Lymphatic: Negative for bleeding problem.   Gastrointestinal:  Positive for abdominal pain. Negative for dysphagia and nausea.        BP Readings from Last 5 Encounters:   04/15/25 130/70   25 142/84   24 135/72   23 135/76   23 139/72       Wt Readings from Last 5 Encounters:   04/15/25 77.6 kg (171 lb)   25 79.2 kg (174 lb 9.6 oz)   24 79.4 kg (175 lb)   23 78.6 kg (173 lb 3.2 oz)   23 76.3 kg (168 lb 4 oz)       Objective     /70 (BP Location: Left arm, Patient Position: Sitting, Cuff Size: Adult)   Pulse 70   Ht  "162.6 cm (64\")   Wt 77.6 kg (171 lb)   BMI 29.35 kg/m²     Vitals and nursing note reviewed.   Constitutional:       Appearance: Well-groomed and not in distress.   HENT:      Head: Normocephalic.   Neck:      Vascular: No carotid bruit.   Pulmonary:      Effort: Pulmonary effort is normal.      Breath sounds: Normal breath sounds.   Cardiovascular:      PMI at left midclavicular line. Normal rate. Regular rhythm.      Murmurs: There is no murmur.   Edema:     Peripheral edema absent.   Abdominal:      General: Bowel sounds are normal.      Palpations: Abdomen is soft.      Tenderness: There is no abdominal tenderness.   Musculoskeletal:      Cervical back: Neck supple. Skin:     General: Skin is warm and dry.   Neurological:      Mental Status: Alert, oriented to person, place, and time and oriented to person, place and time.   Psychiatric:         Behavior: Behavior is cooperative.          Procedures: none today         Assessment & Plan   Diagnoses and all orders for this visit:    1. Elevated coronary artery calcium score (Primary)    2. Family history of premature CAD    3. Primary hypertension    4. Hypertriglyceridemia    5. Hypercholesterolemia    6. Gastroesophageal reflux disease without esophagitis    7. Nonalcoholic fatty liver disease    8. Sleep apnea, unspecified type    9. Pulmonary nodule, right      Elevated calcium score/family history of premature CAD  -Patient denies chest pain, palpitations, increase shortness of breath.  She does admit to pressure lower rib cage area.  -Discussed high coronary calcium score indicating increased risk for CAD/MI.  -Recommend nuclear stress test.  Informational handout provided.  Patient agrees to consider testing.  -Continue aspirin, aggressive lipid management    HTN  -BP controlled  -DASH diet  -Continue amlodipine 5 mg daily, Lotensin 20 mg daily, Zebeta 5 mg daily    Hypertriglyceridemia/hypercholesterolemia  -Prior referral to lipid clinic, recently " started on PCSK9 inhibitor  -Diet encouraged    BMI 29  -Information on BMI provided, information on diet and exercise provided  -Goal BMI of 25 encouraged    Fatty liver/GERD  -Patient plans to follow-up with GI regarding recent upper abdominal/lower chest pressure.  -Information on fatty liver provided  -Diet, weight loss encouraged    Pulmonary nodule/MENA  -Plans to follow-up with Dr. Bethea regarding pulmonary nodule-currently patient declines CPAP as sleep apnea symptoms are mild.    6-month follow-up visit scheduled.               Electronically signed by Carlyn Devi, SANDY,  April 15, 2025 16:48 EDT    Dictated Utilizing Dragon Dictation: Part of this note may be an electronic transcription/translation of spoken language to printed text using the Dragon Dictation System.

## 2025-05-02 ENCOUNTER — SPECIALTY PHARMACY (OUTPATIENT)
Dept: PHARMACY | Facility: HOSPITAL | Age: 65
End: 2025-05-02
Payer: MEDICARE

## 2025-05-02 RX ORDER — ALIROCUMAB 75 MG/ML
75 INJECTION, SOLUTION SUBCUTANEOUS
Qty: 6 ML | Refills: 2 | Status: SHIPPED | OUTPATIENT
Start: 2025-05-02

## 2025-05-02 NOTE — PROGRESS NOTES
Medication Management Clinic/ Specialty Pharmacy Patient Management Program  Lipid Management Program - PCSK9i Initial Assessment     Tessa De La Rosa is a 64 y.o. female referred by their provider, Carlyn Devi, to the Hyperlipidemia Patient Management program offered by [facility] for Lipid Management.  An initial outreach was conducted, including assessment of therapy appropriateness and specialty medication education for Praluent. The patient was introduced to services offered by [facility], including: regular assessments, refill coordination, curbside pick-up or mail order delivery options, prior authorization maintenance, and financial assistance programs as applicable. The patient was also provided with contact information for the pharmacy team.     Tessa De La Rosa is  treated for elevated coronary calcium score and currently takes nothing for cholesterol.  In the past, Pt has been prescribed atorvastatin 80mg but chose not to take due to risk of liver injury with her history of non-alcoholic fatty liver disease.  The patient denies any allergies to latex.      While this is not patients initial appointment., this is patients initial fill for [facility], therefore clinical assessment and new Rx was sent. Patient reports tolerating Praluent well. She denies any side effects thus far and reported no questions.       Insurance Coverage & Financial Support  Rx Medicare Part D + [employer]     Relevant Past Medical History and Comorbidities  Relevant medical history and concomitant health conditions were discussed with the patient. The patient's chart has been reviewed for relevant past medical history and comorbid conditions and updated as necessary.  Past Medical History:   Diagnosis Date    Abnormal ECG February2023    Arrhythmia February 2023    Fatty liver     GERD (gastroesophageal reflux disease)     HTN (hypertension)      Hypothyroid     Sleep apnea      Social History     Socioeconomic History    Marital status:    Tobacco Use    Smoking status: Never     Passive exposure: Never    Smokeless tobacco: Never   Vaping Use    Vaping status: Never Used   Substance and Sexual Activity    Alcohol use: Never    Drug use: Never    Sexual activity: Yes     Partners: Male     Birth control/protection: Post-menopausal       Problem list reviewed by Yeni Hawley PharmD on 5/2/2025 at  2:49 PM    Allergies  Known allergies and reactions were discussed with the patient. The patient's chart has been reviewed for  allergy information and updated as necessary.   No Known Allergies    Allergies reviewed by Yeni Hawley PharmD on 5/2/2025 at  2:48 PM    Relevant Laboratory Values  Relevant laboratory values were discussed with the patient. The following specialty medication dose adjustment(s) are recommended: See plan, if applicable   Lab Results   Component Value Date    CHOL 170 01/30/2025    TRIG 141 01/30/2025    HDL 46 01/30/2025    LDL 99 01/30/2025       Current Medication List  This medication list has been reviewed with the patient and evaluated for any interactions or necessary modifications/recommendations, and updated to include all prescription medications, OTC medications, and supplements the patient is currently taking.  This list reflects what is contained in the patient's profile, which has also been marked as reviewed to communicate to other providers it is the most up to date version of the patient's current medication therapy.     Current Outpatient Medications:     Alirocumab (Praluent) 75 MG/ML solution auto-injector, Inject 1 mL under the skin into the appropriate area as directed Every 14 (Fourteen) Days., Disp: 6 mL, Rfl: 2    amLODIPine (NORVASC) 5 MG tablet, Take 1 tablet by mouth Daily., Disp: , Rfl:     aspirin 81 MG EC tablet, Take 1 tablet by mouth Daily., Disp: , Rfl:     benazepril  (LOTENSIN) 20 MG tablet, Take 1 tablet by mouth Daily., Disp: , Rfl:     bisoprolol (ZEBeta) 5 MG tablet, TAKE 1 TABLET BY MOUTH DAILY, Disp: 90 tablet, Rfl: 3    levothyroxine (SYNTHROID, LEVOTHROID) 88 MCG tablet, Take 1 tablet by mouth Daily., Disp: , Rfl:     omeprazole (priLOSEC) 20 MG capsule, Take 1 capsule by mouth Daily., Disp: , Rfl:     Medicines reviewed by Yeni Hawley, PharmD on 5/2/2025 at  2:49 PM    Drug Interactions  None with Praluent     Adherence and Self-Administration  Adherence related to the patient's specialty therapy was discussed with the patient. The Adherence segment of this outreach has been reviewed and updated.              Methods for Supporting Patient Adherence and/or Self-Administration: see plan, if applicable       Goals of Therapy  Goals related to the patient's specialty therapy were discussed with the patient. The Patient Goals segment of this outreach has been reviewed and updated.   Goals Addressed Today        Specialty Pharmacy General Goal      LDL less than 70               Medication Assessment & Plan  Medication Therapy Changes: Patient will continue on Praluent 75mg every 2 weeks.   Welcome information and patient satisfaction survey to be sent by specialty pharmacy team with patient's initial fill.  Related Plans, Therapy Recommendations, or Therapy Problems to Be Addressed: NA  Patient will need lipid panel in 6 weeks, order placed.   Patient will continue regular follow-up with cardiology.   Patient will follow up with specialty pharmacy mail-out services. Care Coordinator to set up future refill outreaches, coordinate prescription delivery, and escalate clinical questions to pharmacist.  Pharmacist to perform regular assessments no more than (6) months from the previous assessment. Will follow-up in 6 months, or sooner if needed.    Initial Education Provided for Specialty Medication (previously provided on first visit on 4/11/25)  The patient has been  provided with the following education and any applicable administration techniques (i.e. self-injection) have been demonstrated for the therapies indicated. All questions and concerns have been addressed prior to the patient receiving the medication, and the patient has verbalized comprehension of the education and any materials provided. Additional patient education shall be provided and documented upon request by the patient, provider, or payer.    PRALUENT® (alirocumab)  Medication Expectations   Why am I taking this medication? You are taking Praluent to lower your “bad” cholesterol (LDL-C). This medication can be used in adults with high blood cholesterol including primary hyperlipidemia and familial hypercholesterolemia. It can help reduce the risk of heart attack and stroke.      What should I expect while on this medication? You should expect to see your cholesterol improve over time. Specifically, you should see your LDL-C decrease.    How does the medication work? Praluent works by blocking a protein called PCSK9 that contributes to high levels of bad cholesterol and it helps increase your liver's ability to remove bad cholesterol from your blood.     How long will I be on this medication for? The amount of time you will be on this medication will be determined by your doctor based on your cholesterol and/or your risk of having a cardiac event. You will most likely be on this medication or another cholesterol medication throughout your lifetime. Do not abruptly stop this medication without talking to your doctor first.    How do I take this medication? Take as directed on your prescription label. Praluent is injected under the skin (subcutaneously) of your stomach, thigh, or upper arm. This medication is usually given one or twice a month.     What are some possible side effects? The most common side effects of Praluent include redness, itching, swelling, or pain/tenderness at the injection site, symptoms  of the common cold, and flu or flu-like symptoms. Praluent can also cause diarrhea and muscle spasms.    What happens if I miss a dose? If you miss a dose, take it as soon as you remember if it is within 7 days from the usual day of administration then resume your original schedule. If it is beyond 7 days, skip the missed dose and resume your normal dosing schedule.      Medication Safety   What are things I should warn my doctor immediately about? Talk to your doctor if you are pregnant, planning to become pregnant, or breastfeeding. Stop the medication and tell your doctor or seek emergency medical help if you notice any signs/symptoms of an allergic reaction (severe rash, redness, hives, severe itching, trouble breathing, or swelling of the face, lips, or tongue). Do not take Praluent if you have an allergy to alirocumab or any of the ingredients in Praluent.    What are things that I should be cautious of? Be cautious of any side effects from this medication. Talk to your doctor if any new ones develop or aren't getting better.   What are some medications that can interact with this one? There are no known significant drug interactions with Praluent. Always tell your doctor or pharmacist immediately if you start taking any new medications, including over-the-counter medications, vitamins, and herbal supplements.      Medication Storage/Handling   How should I handle this medication? Do not shake or expose the pen to extreme heat or direct sunlight. Keep this medication out of reach of pets/children.    How does this medication need to be stored? Store unused pens in the refrigerator in the original carton to protect from light. Allow medication to warm at room temperature prior to administration. If needed, Praluent may be kept at room temperature in the original carton for up to 30 days. Do not freeze.    How should I dispose of this medication? The device is a single-dose disposable pen and should be discarded  in a sharps container after use. The blue caps should also be placed in a sharps container. If you do not own a sharps container, you may use a household container made of heavy-duty plastic with a tight-fitting lid that is leak resistant (e.g., heavty-duty plastic laundry detergent bottle).  If your doctor decides to stop this medication, take to your local police station for proper disposal. Some pharmacies also have take-back bins for medication drop-off.      Resources/Support   How can I remind myself to take this medication? You can download reminder apps to help you manage your refills. You may also set an alarm on your phone to remind you to take your dose.    Is financial support available?  [employer] can provide co-pay cards if you have commercial insurance or patient assistance if you have Medicare or no insurance.    Which vaccines are recommended for me? Talk to your doctor about these vaccines: Flu, Coronavirus (COVID-19), Pneumococcal (pneumonia), Tdap, Hepatitis B, Zoster (shingles)      Attestation  Therapeutic appropriateness: Appropriate   I attest the patient was actively involved in and has agreed to the above plan of care. If the prescribed therapy is at any point deemed not appropriate based on the current or future assessments, a consultation will be initiated with the patient's specialty care provider to determine the best course of action. The revised plan of therapy will be documented along with any required assessments and/or additional patient education provided.     Yeni Wallace. Chandan, PharmD  5/2/2025  15:08 EDT

## 2025-06-10 ENCOUNTER — LAB (OUTPATIENT)
Dept: LAB | Facility: HOSPITAL | Age: 65
End: 2025-06-10
Payer: MEDICARE

## 2025-06-10 DIAGNOSIS — Z82.49 FAMILY HISTORY OF PREMATURE CAD: ICD-10-CM

## 2025-06-10 DIAGNOSIS — R93.1 ELEVATED CORONARY ARTERY CALCIUM SCORE: ICD-10-CM

## 2025-06-10 LAB
CHOLEST SERPL-MCNC: 91 MG/DL (ref 0–200)
HDLC SERPL-MCNC: 41 MG/DL (ref 40–60)
LDLC SERPL CALC-MCNC: 27 MG/DL (ref 0–100)
LDLC/HDLC SERPL: 0.58 {RATIO}
TRIGL SERPL-MCNC: 132 MG/DL (ref 0–150)
VLDLC SERPL-MCNC: 23 MG/DL (ref 5–40)

## 2025-06-10 PROCEDURE — 80061 LIPID PANEL: CPT

## 2025-06-10 PROCEDURE — 36415 COLL VENOUS BLD VENIPUNCTURE: CPT

## 2025-07-03 ENCOUNTER — SPECIALTY PHARMACY (OUTPATIENT)
Dept: CARDIOLOGY | Facility: HOSPITAL | Age: 65
End: 2025-07-03
Payer: MEDICARE

## 2025-07-03 NOTE — PROGRESS NOTES
Specialty Pharmacy Patient Management Program  Pharmacist Escalation     Tessa De La Rosa is seen by an their provider for Praluent and is enrolled in the Hyperlipidemia Patient Management program offered by [facility].      Patient recently reported the following, which necessitates escalation to a pharmacist for further investigation or follow-up:     Pt states that starting a week after Praluent initiation, she has been experiencing pain in her right knee.  Pt states that it waxes and wanes and she doesn't notice it every day.  Pt states that she didn't notice it yesterday and walked a lot yesterday but today noticed the pain and it's hard to go up steps.  Per pt she wasn't sure if the right knee pan was related to the Praluent or arthritis.    Sent message to cardiologist Carlyn Devi to see if pt is to continue the Praluent and monitor right knee pain or if it is to be dc'ed.    Keenan Jarquin RPH  7/3/2025  09:20 EDT

## 2025-07-03 NOTE — PROGRESS NOTES
Specialty Pharmacy Patient Management Program  Refill Outreach     Tessa was contacted today regarding refills of their medication(s).    Refill Questions      Flowsheet Row Most Recent Value   Changes to allergies? No   Changes to medications? No   New conditions or infections since last clinic visit Yes   If yes, please describe  Right knee pain   Unplanned office visit, urgent care, ED, or hospital admission in the last 4 weeks  No   How does patient/caregiver feel medication is working? Good   Financial problems or insurance changes  No   Since the previous refill, were any specialty medication doses or scheduled injections missed or delayed?  No   Does this patient require a clinical escalation to a pharmacist? Yes            Delivery Questions      Flowsheet Row Most Recent Value   Delivery method UPS  [Not sent due to side effects. Waiting to hear from cardiologist]   Delivery address verified with patient/caregiver? Yes   Delivery address Home   Other address preferred NA   Number of medications in delivery 0   Medication(s) being filled and delivered Alirocumab (Praluent)   Doses left of specialty medications 0   Copay verified? Yes   Copay amount $0.00   Copay form of payment No copayment ($0)   Delivery Date Selection --  [not able to sent until Foreign speaks with Cardiologist]   Signature Required No   Do you consent to receive electronic handouts?  Yes          NOT SENT DUE TO SIDE EFFECT AND WAITING TO HEAR FROM CARDIOLOGIST            Jerri Strickland, Pharmacy Technician  7/3/2025  09:12 EDT

## 2025-07-03 NOTE — LETTER
July 3, 2025     Carlyn Devi, SANDY  [address] [place] [address]    Patient: Tessa De La Rosa   YOB: 1960   Date of Visit: 7/3/2025     Dear SANDY Jordan:     The patient states that starting a week after Praluent initiation, she has been experiencing pain in her right knee. Pt states that it waxes and wanes and she doesn't notice it every day. Pt states that she didn't notice it yesterday and walked a lot yesterday but today noticed the pain and it's hard to go up steps. Per pt she wasn't sure if the right knee pan was related to the Praluent or arthritis.     I wanted to check with you and see if she is to continue the Praluent and monitor the right knee pain, or if it is to be dc'ed?    If you have questions, please do not hesitate to call me. I look forward to following Tessa along with you.         Sincerely,        Foreign Jarquin, Pharmacist

## 2025-07-03 NOTE — PROGRESS NOTES
"Response from cardiologist Carlyn Devi APRN: \"I am unaware of any unilateral symptoms as she has reported.  In regards to literature are you aware of any indication to discontinue the medication based on her report?  If not, then I would recommend continuing for lipid management.\"    PharmD not aware of any literature to discontinue based on the pt's reported unilateral symptoms.  Called pt and informed her per cardiologist to continue Praleunt.  Instructed pt to call lipid clinic if she has continued pain behind right knee or worsening pain.    Keenan Jarquin ANA LUISA  07/03/25  10:28 EDT    "

## 2025-07-10 ENCOUNTER — SPECIALTY PHARMACY (OUTPATIENT)
Dept: CARDIOLOGY | Facility: HOSPITAL | Age: 65
End: 2025-07-10
Payer: MEDICARE

## 2025-07-10 NOTE — PROGRESS NOTES
Specialty Pharmacy Patient Management Program  Refill Outreach     Tessa was contacted today regarding refills of their medication(s).    Refill Questions      Flowsheet Row Most Recent Value   Changes to allergies? No   Changes to medications? No   New conditions or infections since last clinic visit No   Unplanned office visit, urgent care, ED, or hospital admission in the last 4 weeks  No   How does patient/caregiver feel medication is working? Very good   Financial problems or insurance changes  No   Since the previous refill, were any specialty medication doses or scheduled injections missed or delayed?  No   Does this patient require a clinical escalation to a pharmacist? No            Delivery Questions      Flowsheet Row Most Recent Value   Delivery method UPS   Delivery address verified with patient/caregiver? Yes   Delivery address Home   Other address preferred n/a   Number of medications in delivery 1   Medication(s) being filled and delivered Alirocumab (Praluent)   Doses left of specialty medications 0   Copay verified? Yes   Copay amount $0.00   Copay form of payment No copayment ($0)   Delivery Date Selection 07/11/25   Signature Required No   Do you consent to receive electronic handouts?  No                 Follow-up: 84 day(s)     Jerri Strickland, Pharmacy Technician  7/10/2025  09:53 EDT